# Patient Record
Sex: FEMALE | Race: WHITE | Employment: FULL TIME | URBAN - METROPOLITAN AREA
[De-identification: names, ages, dates, MRNs, and addresses within clinical notes are randomized per-mention and may not be internally consistent; named-entity substitution may affect disease eponyms.]

---

## 2017-05-18 ENCOUNTER — TRANSFERRED RECORDS (OUTPATIENT)
Dept: HEALTH INFORMATION MANAGEMENT | Facility: CLINIC | Age: 27
End: 2017-05-18

## 2017-05-18 LAB
HPV ABSTRACT: NORMAL
PAP-ABSTRACT: NORMAL

## 2018-02-24 ENCOUNTER — TELEPHONE (OUTPATIENT)
Dept: OBGYN | Facility: CLINIC | Age: 28
End: 2018-02-24

## 2018-02-24 NOTE — TELEPHONE ENCOUNTER
"Patient sent a web request today:    \"Pap.\"    Patient is requesting Odessa Lance MD at [facility].    Please contact patient.    Thank you.    Central Scheduling  Liset ESQUIVEL"

## 2018-03-15 ENCOUNTER — OFFICE VISIT (OUTPATIENT)
Dept: OBGYN | Facility: CLINIC | Age: 28
End: 2018-03-15
Payer: COMMERCIAL

## 2018-03-15 VITALS
BODY MASS INDEX: 24.99 KG/M2 | HEART RATE: 76 BPM | DIASTOLIC BLOOD PRESSURE: 64 MMHG | HEIGHT: 65 IN | SYSTOLIC BLOOD PRESSURE: 120 MMHG | WEIGHT: 150 LBS

## 2018-03-15 DIAGNOSIS — Z11.59 SCREENING FOR VIRAL AND CHLAMYDIAL DISEASES: ICD-10-CM

## 2018-03-15 DIAGNOSIS — Z78.9 USES BIRTH CONTROL: ICD-10-CM

## 2018-03-15 DIAGNOSIS — Z11.8 SCREENING FOR VIRAL AND CHLAMYDIAL DISEASES: ICD-10-CM

## 2018-03-15 DIAGNOSIS — R87.810 ASCUS WITH POSITIVE HIGH RISK HPV CERVICAL: ICD-10-CM

## 2018-03-15 DIAGNOSIS — Z01.419 ENCOUNTER FOR GYNECOLOGICAL EXAMINATION WITHOUT ABNORMAL FINDING: Primary | ICD-10-CM

## 2018-03-15 DIAGNOSIS — R87.610 ASCUS WITH POSITIVE HIGH RISK HPV CERVICAL: ICD-10-CM

## 2018-03-15 DIAGNOSIS — Z80.41 FAMILY HISTORY OF OVARIAN CANCER: ICD-10-CM

## 2018-03-15 LAB
HBV CORE AB SERPL QL IA: NONREACTIVE
HBV SURFACE AB SERPL IA-ACNC: 305.54 M[IU]/ML
HCV AB SERPL QL IA: NONREACTIVE
HIV 1+2 AB+HIV1 P24 AG SERPL QL IA: NONREACTIVE

## 2018-03-15 PROCEDURE — 86803 HEPATITIS C AB TEST: CPT | Performed by: OBSTETRICS & GYNECOLOGY

## 2018-03-15 PROCEDURE — 87389 HIV-1 AG W/HIV-1&-2 AB AG IA: CPT | Performed by: OBSTETRICS & GYNECOLOGY

## 2018-03-15 PROCEDURE — 88175 CYTOPATH C/V AUTO FLUID REDO: CPT | Performed by: OBSTETRICS & GYNECOLOGY

## 2018-03-15 PROCEDURE — 87591 N.GONORRHOEAE DNA AMP PROB: CPT | Performed by: OBSTETRICS & GYNECOLOGY

## 2018-03-15 PROCEDURE — 86704 HEP B CORE ANTIBODY TOTAL: CPT | Performed by: OBSTETRICS & GYNECOLOGY

## 2018-03-15 PROCEDURE — 36415 COLL VENOUS BLD VENIPUNCTURE: CPT | Performed by: OBSTETRICS & GYNECOLOGY

## 2018-03-15 PROCEDURE — 87624 HPV HI-RISK TYP POOLED RSLT: CPT | Performed by: OBSTETRICS & GYNECOLOGY

## 2018-03-15 PROCEDURE — 86706 HEP B SURFACE ANTIBODY: CPT | Performed by: OBSTETRICS & GYNECOLOGY

## 2018-03-15 PROCEDURE — 87491 CHLMYD TRACH DNA AMP PROBE: CPT | Performed by: OBSTETRICS & GYNECOLOGY

## 2018-03-15 PROCEDURE — 99385 PREV VISIT NEW AGE 18-39: CPT | Performed by: OBSTETRICS & GYNECOLOGY

## 2018-03-15 PROCEDURE — 86780 TREPONEMA PALLIDUM: CPT | Performed by: OBSTETRICS & GYNECOLOGY

## 2018-03-15 RX ORDER — DROSPIRENONE AND ETHINYL ESTRADIOL 0.02-3(28)
1 KIT ORAL DAILY
Qty: 84 TABLET | Refills: 4 | Status: SHIPPED | OUTPATIENT
Start: 2018-03-15 | End: 2019-08-30

## 2018-03-15 RX ORDER — DROSPIRENONE AND ETHINYL ESTRADIOL 0.02-3(28)
KIT ORAL
COMMUNITY
Start: 2017-05-18 | End: 2018-03-15

## 2018-03-15 ASSESSMENT — ANXIETY QUESTIONNAIRES
7. FEELING AFRAID AS IF SOMETHING AWFUL MIGHT HAPPEN: NOT AT ALL
5. BEING SO RESTLESS THAT IT IS HARD TO SIT STILL: NOT AT ALL
6. BECOMING EASILY ANNOYED OR IRRITABLE: SEVERAL DAYS
GAD7 TOTAL SCORE: 1
IF YOU CHECKED OFF ANY PROBLEMS ON THIS QUESTIONNAIRE, HOW DIFFICULT HAVE THESE PROBLEMS MADE IT FOR YOU TO DO YOUR WORK, TAKE CARE OF THINGS AT HOME, OR GET ALONG WITH OTHER PEOPLE: NOT DIFFICULT AT ALL
3. WORRYING TOO MUCH ABOUT DIFFERENT THINGS: NOT AT ALL
1. FEELING NERVOUS, ANXIOUS, OR ON EDGE: NOT AT ALL
2. NOT BEING ABLE TO STOP OR CONTROL WORRYING: NOT AT ALL

## 2018-03-15 ASSESSMENT — PATIENT HEALTH QUESTIONNAIRE - PHQ9: 5. POOR APPETITE OR OVEREATING: NOT AT ALL

## 2018-03-15 NOTE — Clinical Note
Please abstract the following data from this visit with this patient into the appropriate field in Epic:  Pap smear done on this date: 5/18/2017 per Care Everywhere, by this group: [facility], results were ASCUS HPV-.

## 2018-03-15 NOTE — PROGRESS NOTES
Lilia is a 28 year old  female who presents for annual exam.     Besides routine health maintenance, needs refill on OCP.    HPI:  The patient does not have PCP       NP    Hx genital outbreaks HSV1 last 1yr ago. Tried suppressive valtrex in past, increased her outbreaks.     Mom with ovarian cancer at age 40    On Bonnie, happy with this. Has been on for 7-8 years. Takes continuously. Started when she was younger for acne. Mostly good about taking regularly.     Exercise varied. No supplements.      Needle stick at work 6mo ago, pt and her have been tested and neg. Wants repeat STI testing.           GYNECOLOGIC HISTORY:    Patient's last menstrual period was 2018.  Her current contraception method is: oral contraceptives.  She  reports that she has never smoked. She has never used smokeless tobacco.    Patient is sexually active.  STD testing offered?  Accepted  Last PHQ-9 score on record =   PHQ-9 SCORE 3/15/2018   Total Score 0     Last GAD7 score on record =   DARCI-7 SCORE 3/15/2018   Total Score 1     Alcohol Score = 5    HEALTH MAINTENANCE:  Cholesterol: (No results found for: CHOL   Last Mammo: NA, Result: not applicable, Next Mammo: due at 39   Pap: ASCUS HPV- 5/18/17 at [facility]  Per Care Everywhere- Pap history from [place] sent to [facility]  2009 (age 19): ASCUS HPV-  10 (age 20): LSIL  2011: ASCUS HPV-  10/2014: ASCUS HPV-  2015: LSIL  2015: [place], biopsy taken, pathology result not included with records  2017 ([facility]): ASCUS HPV-    Colonoscopy:  NA, Result: not applicable, Next Colonoscopy: due at 49 years.  Dexa:  NA    Health maintenance updated:  yes    HISTORY:  Obstetric History       T0      L0     SAB0   TAB0   Ectopic0   Multiple0   Live Births0           There is no problem list on file for this patient.    Past Surgical History:   Procedure Laterality Date     C BREAST AUGMENTATION - TIER 2  2014      Social History  "  Substance Use Topics     Smoking status: Never Smoker     Smokeless tobacco: Never Used     Alcohol use Yes      Problem (# of Occurrences) Relation (Name,Age of Onset)    Breast Cancer (2) Paternal Grandmother (30), Paternal Aunt (50)    CEREBROVASCULAR DISEASE (1) Maternal Grandmother    HEART DISEASE (1) Father    Hyperlipidemia (1) Father    Hypertension (1) Father    OSTEOPOROSIS (1) Mother    Ovarian Cancer (1) Mother (48)            Current Outpatient Prescriptions   Medication Sig     drospirenone-ethinyl estradiol (JAD) 3-0.02 MG per tablet Take 1 tablet by mouth daily     No current facility-administered medications for this visit.      No Known Allergies    Past medical, surgical, social and family histories were reviewed and updated in EPIC.    ROS:   12 point review of systems negative other than symptoms noted below.  Genitourinary: Spotting    EXAM:  /64  Pulse 76  Ht 5' 4.75\" (1.645 m)  Wt 150 lb (68 kg)  LMP 02/28/2018  Breastfeeding? No  BMI 25.15 kg/m2   BMI: Body mass index is 25.15 kg/(m^2).    PHYSICAL EXAM:  Constitutional:  Appearance: Well nourished, well developed, alert, in no acute distress  Neck:  Lymph Nodes:  No lymphadenopathy present    Thyroid:  Gland size normal, nontender, no nodules or masses present  on palpation  Chest:  Respiratory Effort:  Breathing unlabored  Cardiovascular:    Heart: Auscultation:  Regular rate, normal rhythm, no murmurs present  Breasts: Inspection of Breasts:  No lymphadenopathy present., Palpation of Breasts and Axillae:  No masses present on palpation, no breast tenderness., Axillary Lymph Nodes:  No lymphadenopathy present. and No nodularity, asymmetry or nipple discharge bilaterally.  Gastrointestinal:   Abdominal Examination:  Abdomen nontender to palpation, tone normal without rigidity or guarding, no masses present, umbilicus without lesions   Liver and Spleen:  No hepatomegaly present, liver nontender to palpation    Hernias:  No " hernias present  Lymphatic: Lymph Nodes:  No other lymphadenopathy present  Skin:  General Inspection:  No rashes present, no lesions present, no areas of  discoloration    Genitalia and Groin:  No rashes present, no lesions present, no areas of  discoloration, no masses present  Neurologic/Psychiatric:    Mental Status:  Oriented X3     Pelvic Exam:  External Genitalia:     Normal appearance for age, no discharge present, no tenderness present, no inflammatory lesions present, color normal  Vagina:     Normal vaginal vault without central or paravaginal defects, no discharge present, no inflammatory lesions present, no masses present  Bladder:     Nontender to palpation  Urethra:   Urethral Body:  Urethra palpation normal, urethra structural support normal   Urethral Meatus:  No erythema or lesions present  Cervix:     Appearance healthy, no lesions present, nontender to palpation, no bleeding present  Uterus:     Uterus: firm, normal sized and nontender, midplane in position.   Adnexa:     No adnexal tenderness present, no adnexal masses present  Perineum:     Perineum within normal limits, no evidence of trauma, no rashes or skin lesions present  Anus:     Anus within normal limits, no hemorrhoids present  Inguinal Lymph Nodes:     No lymphadenopathy present  Pubic Hair:     Normal pubic hair distribution for age  Genitalia and Groin:     No rashes present, no lesions present, no areas of discoloration, no masses present      COUNSELING:   Reviewed preventive health counseling, as reflected in patient instructions       Contraception       Folic Acid Counseling    BMI: Body mass index is 25.15 kg/(m^2).  Weight management plan: Discussed healthy diet and exercise guidelines and patient will follow up in 12 months in clinic to re-evaluate.    ASSESSMENT:  28 year old female with satisfactory annual exam.    ICD-10-CM    1. Encounter for gynecological examination without abnormal finding Z01.419    2. Screening for  viral and chlamydial diseases Z11.59 NEISSERIA GONORRHOEA PCR    Z11.8 CHLAMYDIA TRACHOMATIS PCR     HIV Antigen Antibody Combo     Anti Treponema     Hepatitis C antibody     Hepatitis B core antibody     Hepatitis B Surface Antibody     CANCELED: HCL HEPATITIS B SCREENING IN NON-PREGNANT INDIVIDUAL   3. Uses birth control Z30.9 drospirenone-ethinyl estradiol (JAD) 3-0.02 MG per tablet   4. Needle stick injury W27.3XXA HIV Antigen Antibody Combo     Hepatitis C antibody     Hepatitis B core antibody     Hepatitis B Surface Antibody     CANCELED: HCL HEPATITIS B SCREENING IN NON-PREGNANT INDIVIDUAL   5. Family history of ovarian cancer Z80.41 CANCER RISK MGMT/CANCER GENETIC COUNSELING REFERRAL   6. ASCUS with positive high risk HPV cervical R87.610     R87.810        PLAN:  -Pap/hpv obtained for cervical cancer screening. Will need to get records to verify dysplasia hx and surveillance needs moving forward.  -Breast self awareness discussed.   -Colonoscopy age 49  -Osteoporosis prevention discussed.  -STI labs  -Refill OCPs, happy with   -Return one year for next annual exam      Odessa Harris Masters, DO

## 2018-03-15 NOTE — LETTER
March 27, 2018    Lilia Brown  [address] [address]    Dear Lilia,  We are happy to inform you that your PAP smear result from 3/15/18 is normal.  We are now able to do a follow up test on PAP smears. The DNA test is for HPV (Human Papilloma Virus). Cervical cancer is closely linked with certain types of HPV. Your result showed no evidence of high risk HPV.  Therefore we recommend you return in 3 years for your next pap smear and HPV test.  You will still need to return to the clinic every year for an annual exam and other preventive tests.  Please contact the clinic at [phone] with any questions.  Sincerely,    Odessa Harris Masters, DO/rlm

## 2018-03-15 NOTE — MR AVS SNAPSHOT
After Visit Summary   3/15/2018    Lilia Brown    MRN: [id number]           Patient Information     Date Of Birth          1990        Visit Information        Provider Department      3/15/2018 8:30 AM Odessa Lance DO [facility]        Today's Diagnoses     Encounter for gynecological examination without abnormal finding    -  1    Screening for viral and chlamydial diseases        Uses birth control        Needle stick injury        Family history of ovarian cancer           Follow-ups after your visit        Additional Services     CANCER RISK MGMT/CANCER GENETIC COUNSELING REFERRAL       Your provider has referred you to the Cancer Risk Management Program - Cancer Genetic Counseling.    Reason for Referral: Maternal history ovarian cancer age 48. PGM breast cancer age 29, PAunt breast ca age 49    We have a sent a notice to a staff member of the Cancer Risk Management Program to give you a call to assist with scheduling your appointment.  You may also call  [phone] ([phone]) to initiate scheduling.    Please be aware that coverage of these services is subject to the terms and limitations of your health insurance plan.  Call member services at your health plan with any benefit or coverage questions.      Please bring the completed family history sheet to your appointment in addition to any available outside medical records documenting your cancer diagnosis.                  Follow-up notes from your care team     Return in about 1 year (around 3/15/2019).      Who to contact     If you have questions or need follow up information about today's clinic visit or your schedule please contact [facility] directly at [phone].  Normal or non-critical lab and imaging results will be communicated to you by MyChart, letter or phone within 4 business days after the clinic has received the results. If you do not hear from us  "within 7 days, please contact the clinic through [employer] or phone. If you have a critical or abnormal lab result, we will notify you by phone as soon as possible.  Submit refill requests through [employer] or call your pharmacy and they will forward the refill request to us. Please allow 3 business days for your refill to be completed.          Additional Information About Your Visit        [employer] Information     [employer] lets you send messages to your doctor, view your test results, renew your prescriptions, schedule appointments and more. To sign up, go to [email or web address] . Click on \"Log in\" on the left side of the screen, which will take you to the Welcome page. Then click on \"Sign up Now\" on the right side of the page.     You will be asked to enter the access code listed below, as well as some personal information. Please follow the directions to create your username and password.     Your access code is: [email or web address]  Expires: 2018  9:23 AM     Your access code will  in 90 days. If you need help or a new code, please call your [place] clinic or [phone].        Care EveryWhere ID     This is your Care EveryWhere ID. This could be used by other organizations to access your [place] medical records  [id number]        Your Vitals Were     Pulse Height Last Period Breastfeeding? BMI (Body Mass Index)       76 5' 4.75\" (1.645 m) 2018 No 25.15 kg/m2        Blood Pressure from Last 3 Encounters:   03/15/18 120/64    Weight from Last 3 Encounters:   03/15/18 150 lb (68 kg)              We Performed the Following     Anti Treponema     CANCER RISK MGMT/CANCER GENETIC COUNSELING REFERRAL     CHLAMYDIA TRACHOMATIS PCR     Hepatitis B core antibody     Hepatitis B Surface Antibody     Hepatitis C antibody     HIV Antigen Antibody Combo     NEISSERIA GONORRHOEA PCR          Today's Medication Changes          These changes are accurate as of 3/15/18  9:23 AM.  If you have any questions, ask " your nurse or doctor.               These medicines have changed or have updated prescriptions.        Dose/Directions    drospirenone-ethinyl estradiol 3-0.02 MG per tablet   Commonly known as:  JAD   This may have changed:    - how much to take  - when to take this   Used for:  Uses birth control   Changed by:  Odessa Lance,         Dose:  1 tablet   Take 1 tablet by mouth daily   Quantity:  84 tablet   Refills:  4            Where to get your medicines      These medications were sent to [facility] [id number] [place] [facility] [address] [address]  [address] [address]     Phone:  [phone]     drospirenone-ethinyl estradiol 3-0.02 MG per tablet                Primary Care Provider    None Specified       No primary provider on file.        Equal Access to Services     ALICIA ESTEBAN : Rebecca Rayo, waaxda luqadaha, qaybta kaalmada taylor, neftaly jimenez. So [facility] [phone].    ATENCIÓN: Si habla español, tiene a kaplan disposición servicios gratuitos de asistencia lingüística. [facility] [phone].    We comply with applicable federal civil rights laws and [place] laws. We do not discriminate on the basis of race, color, national origin, age, disability, sex, sexual orientation, or gender identity.            Thank you!     Thank you for choosing [facility]  for your care. Our goal is always to provide you with excellent care. Hearing back from our patients is one way we can continue to improve our services. Please take a few minutes to complete the written survey that you may receive in the mail after your visit with us. Thank you!             Your Updated Medication List - Protect others around you: Learn how to safely use, store and throw away your medicines at www.disposemymeds.org.          This list is accurate as of 3/15/18  9:23 AM.  Always use your most recent med list.                   Brand Name  Dispense Instructions for use Diagnosis    drospirenone-ethinyl estradiol 3-0.02 MG per tablet    JAD    84 tablet    Take 1 tablet by mouth daily    Uses birth control

## 2018-03-16 LAB
C TRACH DNA SPEC QL NAA+PROBE: NEGATIVE
N GONORRHOEA DNA SPEC QL NAA+PROBE: NEGATIVE
SPECIMEN SOURCE: NORMAL
SPECIMEN SOURCE: NORMAL
T PALLIDUM IGG+IGM SER QL: NEGATIVE

## 2018-03-16 ASSESSMENT — PATIENT HEALTH QUESTIONNAIRE - PHQ9: SUM OF ALL RESPONSES TO PHQ QUESTIONS 1-9: 0

## 2018-03-16 ASSESSMENT — ANXIETY QUESTIONNAIRES: GAD7 TOTAL SCORE: 1

## 2018-03-22 LAB
COPATH REPORT: NORMAL
PAP: NORMAL

## 2018-03-23 LAB
FINAL DIAGNOSIS: NORMAL
HPV HR 12 DNA CVX QL NAA+PROBE: NEGATIVE
HPV16 DNA SPEC QL NAA+PROBE: NEGATIVE
HPV18 DNA SPEC QL NAA+PROBE: NEGATIVE
SPECIMEN DESCRIPTION: NORMAL
SPECIMEN SOURCE CVX/VAG CYTO: NORMAL

## 2018-05-12 ENCOUNTER — OFFICE VISIT (OUTPATIENT)
Dept: URGENT CARE | Facility: URGENT CARE | Age: 28
End: 2018-05-12
Payer: COMMERCIAL

## 2018-05-12 ENCOUNTER — RADIANT APPOINTMENT (OUTPATIENT)
Dept: GENERAL RADIOLOGY | Facility: CLINIC | Age: 28
End: 2018-05-12
Attending: PHYSICIAN ASSISTANT
Payer: COMMERCIAL

## 2018-05-12 VITALS
SYSTOLIC BLOOD PRESSURE: 133 MMHG | TEMPERATURE: 99 F | HEART RATE: 88 BPM | DIASTOLIC BLOOD PRESSURE: 90 MMHG | OXYGEN SATURATION: 98 % | RESPIRATION RATE: 16 BRPM

## 2018-05-12 DIAGNOSIS — M25.571 PAIN IN JOINT INVOLVING ANKLE AND FOOT, RIGHT: ICD-10-CM

## 2018-05-12 DIAGNOSIS — M25.572 PAIN IN JOINT INVOLVING ANKLE AND FOOT, LEFT: ICD-10-CM

## 2018-05-12 DIAGNOSIS — S82.831A CLOSED FRACTURE OF DISTAL END OF RIGHT FIBULA, UNSPECIFIED FRACTURE MORPHOLOGY, INITIAL ENCOUNTER: Primary | ICD-10-CM

## 2018-05-12 PROCEDURE — 73610 X-RAY EXAM OF ANKLE: CPT | Mod: LT

## 2018-05-12 PROCEDURE — 99203 OFFICE O/P NEW LOW 30 MIN: CPT | Performed by: PHYSICIAN ASSISTANT

## 2018-05-12 NOTE — MR AVS SNAPSHOT
After Visit Summary   5/12/2018    Lilia Brown    MRN: [id number]           Patient Information     Date Of Birth          1990        Visit Information        Provider Department      5/12/2018 11:25 AM Selene Seo PA-C [facility]        Today's Diagnoses     Closed fracture of distal end of right fibula, initial encounter    -  1      Care Instructions      Ankle Fracture, Distal Fibula  You have a fracture, or broken bone, of the end of the fibula bone. The fibula is one of two bones that support the ankle joint.    Home care    You will be given a splint, cast, or special boot to prevent movement at the injury site. Do not put weight on a splint. It will break. Follow your healthcare provider s advice about when to begin bearing weight on a cast or boot.    Keep your leg elevated when sitting or lying down. When sleeping, place a pillow under the injured leg. When sitting, support the injured leg so it is level with your waist. This is very important during the first 48 hours.    Keep the cast or splint completely dry at all times. When bathing, protect the cast or splint with 2 large plastic bags. Place 1 bag outside of the other. Tape each bag with duct tape at the top end. Water can still leak in even when the foot is covered. So it's best to keep the cast, splint, or boot away from water. If a fiberglass cast or splint gets wet, dry it with a hair dryer on a cool setting.    Place an ice pack over the injured area for no more than 15 to 20 minutes. Do this every 3 to 6 hours for the first 24 to 48 hours. Continue this 3 to 4 times a day as needed. To make an ice pack, put ice cubes in a plastic bag that seals at the top. Wrap the bag in a clean, thin towel or cloth. Never put ice or an ice pack directly on the skin. The ice pack can be put right on the cast or splint. As the ice melts, be careful that the cast or splint doesn t get wet.    You may  use over-the-counter pain medicine to control pain, unless another pain medicine was prescribed. If you have chronic liver or kidney disease or ever had a stomach ulcer or GI bleeding, talk with your provider before using these medicines.  Follow-up care  Follow up with your healthcare provider in 1 week, or as advised. This is to be sure the bone is healing properly. If you were given a splint, it may be changed to a cast after the swelling goes down.  If X-rays were taken, you will be told of any new findings that may affect your care.  When to seek medical advice  Call your healthcare provider right away if any of these occur:    The plaster cast or splint becomes wet or soft    The fiberglass cast or splint stays wet for more than 24 hours    There is increased tightness or pain under the cast or splint    Your toes become swollen, cold, blue, numb, or tingly    The cast becomes loose    The cast has a bad smell    Sore areas develop under the cast    The cast develops cracks or breaks   Date Last Reviewed: 11/23/2015 2000-2017 [employer]. [address] [place] [address]. All rights reserved. This information is not intended as a substitute for professional medical care. Always follow your healthcare professional's instructions.                Follow-ups after your visit        Additional Services     ORTHO  REFERRAL       [facility] is referring you to the Orthopedic  Services at [facility].       The  Representative will assist you in the coordination of your Orthopedic and Musculoskeletal Care as prescribed by your physician.    The  Representative will call you within 1 business day to help schedule your appointment, or you may contact the  Representative at:    All areas ~ [phone]     Type of Referral : Non Surgical       Timeframe requested: 1 - 2 days    Coverage of these services is  subject to the terms and limitations of your health insurance plan.  Please call member services at your health plan with any benefit or coverage questions.      If X-rays, CT or MRI's have been performed, please contact the facility where they were done to arrange for , prior to your scheduled appointment.  Please bring this referral request to your appointment and present it to your specialist.                  Your next 10 appointments already scheduled     May 15, 2018 12:00 PM CDT   (Arrive by 11:45 AM)   NEW WITH ROOM with Vaishnavi Berg GC,  2 114 CONSULT RM   [facility] ([facility])    [address] [place] [address]  [address]  [facility] [id number] [phone]            May 15, 2018  1:15 PM CDT   [facility] Lab Draw with [facility] LAB DRAW   [facility] ([facility])    [address] [place] [address]  [address]  [facility] [id number] [phone]              Who to contact     If you have questions or need follow up information about today's clinic visit or your schedule please contact [facility] directly at [phone].  Normal or non-critical lab and imaging results will be communicated to you by MyChart, letter or phone within 4 business days after the clinic has received the results. If you do not hear from us within 7 days, please contact the clinic through [email or web address] or phone. If you have a critical or abnormal lab result, we will notify you by phone as soon as possible.  Submit refill requests through [employer] or call your pharmacy and they will forward the refill request to us. Please allow 3 business days for your refill to be completed.          Additional Information About Your Visit        [email or web address] Information     [employer] gives you secure access to your electronic health record. If you see a primary care provider, you can also send messages to your care team and make appointments.  If you have questions, please call your primary care clinic.  If you do not have a primary care provider, please call [phone] and they will assist you.        Care EveryWhere ID     This is your Care EveryWhere ID. This could be used by other organizations to access your [place] medical records  [id number]        Your Vitals Were     Pulse Temperature Respirations Pulse Oximetry          88 99  F (37.2  C) (Oral) 16 98%         Blood Pressure from Last 3 Encounters:   05/12/18 133/90   03/15/18 120/64    Weight from Last 3 Encounters:   03/15/18 150 lb (68 kg)              We Performed the Following     ORTHO  REFERRAL          Today's Medication Changes          These changes are accurate as of 5/12/18 12:12 PM.  If you have any questions, ask your nurse or doctor.               Start taking these medicines.        Dose/Directions    order for DME   Used for:  Closed fracture of distal end of right fibula, unspecified fracture morphology, initial encounter   Started by:  Selene Seo PA-C        Equipment being ordered: tall walking boot   Quantity:  1 Device   Refills:  0       order for DME   Used for:  Closed fracture of distal end of right fibula, unspecified fracture morphology, initial encounter   Started by:  Selene Seo PA-C        Equipment being ordered: crutches   Quantity:  1 Device   Refills:  0            Where to get your medicines      Some of these will need a paper prescription and others can be bought over the counter.  Ask your nurse if you have questions.     Bring a paper prescription for each of these medications     order for DME    order for DME                Primary Care Provider Fax #    Provider Not In System [phone]                Equal Access to Services     [facility] AH: Rebecca mikeo Soángela, waaxda luqadaha, qaybta kaalmada taylor, neftaly jimenez. So [facility] [phone].    ATENCIÓN: Igor suarez,  tiene a kaplan disposición servicios gratuitos de asistencia lingüística. Poornima anderson [phone].    We comply with applicable federal civil rights laws and [place] laws. We do not discriminate on the basis of race, color, national origin, age, disability, sex, sexual orientation, or gender identity.            Thank you!     Thank you for choosing [facility]  for your care. Our goal is always to provide you with excellent care. Hearing back from our patients is one way we can continue to improve our services. Please take a few minutes to complete the written survey that you may receive in the mail after your visit with us. Thank you!             Your Updated Medication List - Protect others around you: Learn how to safely use, store and throw away your medicines at www.disposemymeds.org.          This list is accurate as of 5/12/18 12:12 PM.  Always use your most recent med list.                   Brand Name Dispense Instructions for use Diagnosis    drospirenone-ethinyl estradiol 3-0.02 MG per tablet    JAD    84 tablet    Take 1 tablet by mouth daily    Uses birth control       order for DME     1 Device    Equipment being ordered: tall walking boot    Closed fracture of distal end of right fibula, unspecified fracture morphology, initial encounter       order for DME     1 Device    Equipment being ordered: crutches    Closed fracture of distal end of right fibula, unspecified fracture morphology, initial encounter

## 2018-05-12 NOTE — PROGRESS NOTES
"SUBJECTIVE:  Chief Complaint   Patient presents with     Urgent Care     Musculoskeletal Problem     Patient states she fell of her porch this morning heard a \"popping sound\" painful took 400MG ibuprofen at 10:10 AM but not helping with the pain      Lilia Brown is a 28 year old female presents with a chief complaint of right ankle pain.  The injury occurred 2 hour(s) ago. She stepped off a step, fell and heard a \"popping sound\"   The injury happened while at home. How: as aboveimmediate pain, immediate swelling, inability to bear weight directly after injury.  The patient complained of moderate pain  and has had decreased ROM.  Pain exacerbated by walking and weight-bearing.  Relieved by ice.  She treated it initially with Ibuprofen. This is the first time this type of injury has occurred to this patient. NO numbness or tingling into her toes.     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    EXAM:   /90  Pulse 88  Temp 99  F (37.2  C) (Oral)  Resp 16  SpO2 98%  Gen: healthy,alert,no distress  Extremity: ankle has erythema, swelling and tenderness or lateral mallelous  There is not compromise to the distal circulation.  Pulses are +2 and CRT is brisk  GENERAL APPEARANCE: healthy, alert and no distress  EXTREMITIES: peripheral pulses normal  SKIN: no suspicious lesions or " jacobo  NEURO: Normal strength and tone, sensory exam grossly normal, mentation intact and speech normal    X-RAY was done -- fracture of distal fibula    ASSESSMENT  1. Closed fracture of distal end of right fibula, initial encounter  Rest, Ice, Compress, Elevate, no use for 3-4 wks and Ortho referral  - ORTHO  REFERRAL  - order for DME; Equipment being ordered: tall walking boot  Dispense: 1 Device; Refill: 0      Selene Seo PA-C

## 2018-05-12 NOTE — PATIENT INSTRUCTIONS
Ankle Fracture, Distal Fibula  You have a fracture, or broken bone, of the end of the fibula bone. The fibula is one of two bones that support the ankle joint.    Home care    You will be given a splint, cast, or special boot to prevent movement at the injury site. Do not put weight on a splint. It will break. Follow your healthcare provider s advice about when to begin bearing weight on a cast or boot.    Keep your leg elevated when sitting or lying down. When sleeping, place a pillow under the injured leg. When sitting, support the injured leg so it is level with your waist. This is very important during the first 48 hours.    Keep the cast or splint completely dry at all times. When bathing, protect the cast or splint with 2 large plastic bags. Place 1 bag outside of the other. Tape each bag with duct tape at the top end. Water can still leak in even when the foot is covered. So it's best to keep the cast, splint, or boot away from water. If a fiberglass cast or splint gets wet, dry it with a hair dryer on a cool setting.    Place an ice pack over the injured area for no more than 15 to 20 minutes. Do this every 3 to 6 hours for the first 24 to 48 hours. Continue this 3 to 4 times a day as needed. To make an ice pack, put ice cubes in a plastic bag that seals at the top. Wrap the bag in a clean, thin towel or cloth. Never put ice or an ice pack directly on the skin. The ice pack can be put right on the cast or splint. As the ice melts, be careful that the cast or splint doesn t get wet.    You may use over-the-counter pain medicine to control pain, unless another pain medicine was prescribed. If you have chronic liver or kidney disease or ever had a stomach ulcer or GI bleeding, talk with your provider before using these medicines.  Follow-up care  Follow up with your healthcare provider in 1 week, or as advised. This is to be sure the bone is healing properly. If you were given a splint, it may be changed to a  cast after the swelling goes down.  If X-rays were taken, you will be told of any new findings that may affect your care.  When to seek medical advice  Call your healthcare provider right away if any of these occur:    The plaster cast or splint becomes wet or soft    The fiberglass cast or splint stays wet for more than 24 hours    There is increased tightness or pain under the cast or splint    Your toes become swollen, cold, blue, numb, or tingly    The cast becomes loose    The cast has a bad smell    Sore areas develop under the cast    The cast develops cracks or breaks   Date Last Reviewed: 11/23/2015 2000-2017 [employer]. [address] [place] [address]. All rights reserved. This information is not intended as a substitute for professional medical care. Always follow your healthcare professional's instructions.

## 2018-05-14 ENCOUNTER — OFFICE VISIT (OUTPATIENT)
Dept: ORTHOPEDICS | Facility: CLINIC | Age: 28
End: 2018-05-14
Payer: COMMERCIAL

## 2018-05-14 VITALS
HEIGHT: 65 IN | DIASTOLIC BLOOD PRESSURE: 80 MMHG | SYSTOLIC BLOOD PRESSURE: 124 MMHG | BODY MASS INDEX: 24.99 KG/M2 | WEIGHT: 150 LBS

## 2018-05-14 DIAGNOSIS — S82.832A OTHER CLOSED FRACTURE OF DISTAL END OF LEFT FIBULA, INITIAL ENCOUNTER: Primary | ICD-10-CM

## 2018-05-14 PROCEDURE — 27786 TREATMENT OF ANKLE FRACTURE: CPT | Performed by: FAMILY MEDICINE

## 2018-05-14 PROCEDURE — 99204 OFFICE O/P NEW MOD 45 MIN: CPT | Mod: 57 | Performed by: FAMILY MEDICINE

## 2018-05-14 NOTE — MR AVS SNAPSHOT
After Visit Summary   5/14/2018    Lilia Brown    MRN: [id number]           Patient Information     Date Of Birth          1990        Visit Information        Provider Department      5/14/2018 12:00 PM Ag Barrera DO [facility]        Today's Diagnoses     Other closed fracture of distal end of left fibula, initial encounter    -  1      Care Instructions    1. Other closed fracture of distal end of left fibula, initial encounter      Location of pain / swelling is appropriate for the injury  Reviewed xray - fibular fracture, intact mortise  Order for knee scooter  Letter for work: no more than 2 consecutive hours walking/on your feet and no more than 4 hours total during your shift. These recommendation will be in effect x 4 weeks.  Letter for travel - do not recommend any extended walking therefore provide transportation as needed for safe transfers / walking.    Follow up in 3 weeks.          Follow-ups after your visit        Your next 10 appointments already scheduled     May 15, 2018 12:00 PM CDT   (Arrive by 11:45 AM)   NEW WITH ROOM with Vaishnavi Berg GC,  2 114 CONSULT [facility] ([facility])    [address] [place] [address]  [address] [place] [id number] [phone]            May 15, 2018  1:15 PM CDT   [facility] LAB DRAW   [facility] Lab Draw ([facility])    [address] [place] [address]  [address] [place] [id number] [phone]              Who to contact     If you have questions or need follow up information about today's clinic visit or your schedule please contact [facility] directly at [phone].  Normal or non-critical lab and imaging results will be communicated to you by MyChart, letter or phone within 4 business days after the clinic has received the results. If you do not hear from us within 7 days,  "please contact the clinic through [employer] or phone. If you have a critical or abnormal lab result, we will notify you by phone as soon as possible.  Submit refill requests through [employer] or call your pharmacy and they will forward the refill request to us. Please allow 3 business days for your refill to be completed.          Additional Information About Your Visit        Pikimalhart Information     [employer] gives you secure access to your electronic health record. If you see a primary care provider, you can also send messages to your care team and make appointments. If you have questions, please call your primary care clinic.  If you do not have a primary care provider, please call [phone] and they will assist you.        Care EveryWhere ID     This is your Care EveryWhere ID. This could be used by other organizations to access your [facility] medical records  [id number]        Your Vitals Were     Height BMI (Body Mass Index)                5' 4.75\" (1.645 m) 25.15 kg/m2           Blood Pressure from Last 3 Encounters:   05/14/18 124/80   05/12/18 133/90   03/15/18 120/64    Weight from Last 3 Encounters:   05/14/18 150 lb (68 kg)   03/15/18 150 lb (68 kg)              Today, you had the following     No orders found for display       Primary Care Provider Fax #    Provider Not In System [phone]                Equal Access to Services     [facility] : Hadii marielos ku hadasho Soomaali, waaxda luqadaha, qaybta kaalmada adeegyada, neftaly buenrostro hayclarissa rivera . So [facility] [phone].    ATENCIÓN: Si habla español, tiene a kaplan disposición servicios gratuitos de asistencia lingüística. Llame al [phone].    We comply with applicable federal civil rights laws and [place] laws. We do not discriminate on the basis of race, color, national origin, age, disability, sex, sexual orientation, or gender identity.            Thank you!     Thank you for choosing [facility]  for your " care. Our goal is always to provide you with excellent care. Hearing back from our patients is one way we can continue to improve our services. Please take a few minutes to complete the written survey that you may receive in the mail after your visit with us. Thank you!             Your Updated Medication List - Protect others around you: Learn how to safely use, store and throw away your medicines at www.disposemymeds.org.          This list is accurate as of 5/14/18 12:39 PM.  Always use your most recent med list.                   Brand Name Dispense Instructions for use Diagnosis    drospirenone-ethinyl estradiol 3-0.02 MG per tablet    JAD    84 tablet    Take 1 tablet by mouth daily    Uses birth control       order for DME     1 Device    Equipment being ordered: tall walking boot    Closed fracture of distal end of right fibula, unspecified fracture morphology, initial encounter

## 2018-05-14 NOTE — PROGRESS NOTES
ASSESSMENT & PLAN    1. Other closed fracture of distal end of left fibula, initial encounter      Location of pain / swelling is appropriate for the injury  Reviewed xray - fibular fracture, intact mortise  Order for knee scooter  Continue with CAM boot.  Out for range of motion icing  Letter for work: no more than 2 consecutive hours walking/on your feet and no more than 4 hours total during your shift. These recommendation will be in effect x 4 weeks.  Letter for travel - do not recommend any extended walking therefore provide transportation as needed for safe transfers / walking.    Follow up in 3 weeks.    -----    SUBJECTIVE  Lilia Brown is a/an 28 year old female who is seen in consultation at the request of  Selene Seo PA-C for evaluation of left ankle pain. The patient is seen by themselves.    Onset: 2 day(s) ago. Patient describes injury as she stepped in a pothole in her yard and twisted her ankle and fell to the ground  Location of Pain: left lateral ankle, lateral foot  Rating of Pain at worst: 6/10  Rating of Pain Currently: 2/10  Worsened by: at night, bumping the ankle/boot  Better with: Aleve  Treatments tried: rest/activity avoidance, elevation, ice, Tylenol, ibuprofen and CAM boot  Associated symptoms: tingling in 1st-3rd toes at night, swelling, stiffness/tightness, tenderness over the lateral foot and lateral ankle  Orthopedic history: NO  Relevant surgical history: NO  Patient Social History: works as a Nurse at [employer]    Patient's past medical, surgical, social, and family histories were reviewed today and no changes are noted.    REVIEW OF SYSTEMS:  10 point ROS is negative other than symptoms noted above in HPI, Past Medical History or as stated below  Constitutional: NEGATIVE for fever, chills, change in weight  Skin: NEGATIVE for worrisome rashes, moles or lesions  GI/: NEGATIVE for bowel or bladder changes  Neuro: NEGATIVE for weakness, dizziness or  "paresthesias    OBJECTIVE:  /80  Ht 5' 4.75\" (1.645 m)  Wt 150 lb (68 kg)  BMI 25.15 kg/m2   General: healthy, alert and in no distress  HEENT: no scleral icterus or conjunctival erythema  Skin: no suspicious lesions or rash. No jaundice.  CV:  no pedal edema  Resp: normal respiratory effort without conversational dyspnea   Psych: normal mood and affect  Gait: normal steady gait with appropriate coordination and balance  Neuro: Normal light sensory exam of lower extremity  MSK:  LEFT ANKLE  Inspection:  Significant soft tissue swelling and ecchymosis over the lateral ankle and dorsal lateral foot.   Palpation:    Tender about the ATFL, CFL, distal 3rd fibula shaft and minimally over the forefoot.  Nontender over the medial ankle and syndesmosis.  Remainder of bony and ligamentous landmarks are nontender.  Range of Motion:   Limited in all planes  Strength:  Not assessed secondary to fracture  Special Tests:    negative anterior drawer, positive talar tilt, negative forced external rotation/eversion, negative Martel sign    LEFT FOOT  Inspection:    no swelling or ecchymosis  Palpation:    Tender about the peroneal tendon at lateral malleolus. Otherwise remainder of bony/ligamentous landmarks are non-tender.    Independent visualization of the below image:    Recent Results (from the past 744 hour(s))   XR Ankle Left G/E 3 Views    Narrative    ANKLE THREE VIEWS LEFT 5/12/2018 12:04 PM     HISTORY: fell off porch; Pain in joint involving ankle and foot, left    COMPARISON: None.    FINDINGS: There is a fracture through the distal fibula. The fracture  line is located slightly distal to the ankle mortise. There is  associated soft tissue swelling. No displacement.      Impression    IMPRESSION: Fracture distal fibula.    AVELINA COURTNEY MD     Patient's conditions were thoroughly discussed during today's visit with greater than 50% of the visit spent counseling the patient with total time spent face-to-face with " the patient being 20 minutes.    Ag Barrera, DO CAM  [facility]

## 2018-05-14 NOTE — LETTER
May 14, 2018      Lilia Brown  [address] [address]        To Whom It May Concern:    Lilia Brown was seen in our clinic. She may return to work with the following: limited to light duty -recommend no more than 2 consecutive hours of standing/walking and no more than 4 hours total time standing or walking during a shift.  These restrictions are in place for the next 4 weeks.  Patient will schedule follow-up visit prior to the expiration of this letter.      Sincerely,        Ag Barrera DO

## 2018-05-14 NOTE — LETTER
May 14, 2018      Lilia Brown  [address] [address]        To Whom It May Concern:    Lilia Brown was seen in our clinic on 5/14/2018 for a left ankle injury.  Do not recommend any extended walking therefore please provide special transportation for safe transfers/walking while traveling.      Sincerely,        Ag Barrera, DO

## 2018-05-14 NOTE — PATIENT INSTRUCTIONS
1. Other closed fracture of distal end of left fibula, initial encounter      Location of pain / swelling is appropriate for the injury  Reviewed xray - fibular fracture, intact mortise  Order for knee scooter  Letter for work: no more than 2 consecutive hours walking/on your feet and no more than 4 hours total during your shift. These recommendation will be in effect x 4 weeks.  Letter for travel - do not recommend any extended walking therefore provide transportation as needed for safe transfers / walking.    Follow up in 3 weeks.

## 2018-05-14 NOTE — LETTER
5/14/2018         RE: Lilia Brown  [address] [place] [id number]        Dear Colleague,    Thank you for referring your patient, Lilia Brown, to the [facility]. Please see a copy of my visit note below.    ASSESSMENT & PLAN    1. Other closed fracture of distal end of left fibula, initial encounter      Location of pain / swelling is appropriate for the injury  Reviewed xray - fibular fracture, intact mortise  Order for knee scooter  Continue with CAM boot.  Out for range of motion icing  Letter for work: no more than 2 consecutive hours walking/on your feet and no more than 4 hours total during your shift. These recommendation will be in effect x 4 weeks.  Letter for travel - do not recommend any extended walking therefore provide transportation as needed for safe transfers / walking.    Follow up in 3 weeks.    -----    SUBJECTIVE  Lilia Brown is a/an 28 year old female who is seen in consultation at the request of  Selene Seo PA-C for evaluation of left ankle pain. The patient is seen by themselves.    Onset: 2 day(s) ago. Patient describes injury as she stepped in a pothole in her yard and twisted her ankle and fell to the ground  Location of Pain: left lateral ankle, lateral foot  Rating of Pain at worst: 6/10  Rating of Pain Currently: 2/10  Worsened by: at night, bumping the ankle/boot  Better with: Aleve  Treatments tried: rest/activity avoidance, elevation, ice, Tylenol, ibuprofen and CAM boot  Associated symptoms: tingling in 1st-3rd toes at night, swelling, stiffness/tightness, tenderness over the lateral foot and lateral ankle  Orthopedic history: NO  Relevant surgical history: NO  Patient Social History: works as a Nurse at [employer]    Patient's past medical, surgical, social, and family histories were reviewed today and no changes are noted.    REVIEW OF SYSTEMS:  10 point ROS is negative other than symptoms noted above in HPI, Past Medical  "History or as stated below  Constitutional: NEGATIVE for fever, chills, change in weight  Skin: NEGATIVE for worrisome rashes, moles or lesions  GI/: NEGATIVE for bowel or bladder changes  Neuro: NEGATIVE for weakness, dizziness or paresthesias    OBJECTIVE:  /80  Ht 5' 4.75\" (1.645 m)  Wt 150 lb (68 kg)  BMI 25.15 kg/m2   General: healthy, alert and in no distress  HEENT: no scleral icterus or conjunctival erythema  Skin: no suspicious lesions or rash. No jaundice.  CV:  no pedal edema  Resp: normal respiratory effort without conversational dyspnea   Psych: normal mood and affect  Gait: normal steady gait with appropriate coordination and balance  Neuro: Normal light sensory exam of lower extremity  MSK:  LEFT ANKLE  Inspection:  Significant soft tissue swelling and ecchymosis over the lateral ankle and dorsal lateral foot.   Palpation:    Tender about the ATFL, CFL, distal 3rd fibula shaft and minimally over the forefoot.  Nontender over the medial ankle and syndesmosis.  Remainder of bony and ligamentous landmarks are nontender.  Range of Motion:   Limited in all planes  Strength:  Not assessed secondary to fracture  Special Tests:    negative anterior drawer, positive talar tilt, negative forced external rotation/eversion, negative Martel sign    LEFT FOOT  Inspection:    no swelling or ecchymosis  Palpation:    Tender about the peroneal tendon at lateral malleolus. Otherwise remainder of bony/ligamentous landmarks are non-tender.    Independent visualization of the below image:    Recent Results (from the past 744 hour(s))   XR Ankle Left G/E 3 Views    Narrative    ANKLE THREE VIEWS LEFT 5/12/2018 12:04 PM     HISTORY: fell off porch; Pain in joint involving ankle and foot, left    COMPARISON: None.    FINDINGS: There is a fracture through the distal fibula. The fracture  line is located slightly distal to the ankle mortise. There is  associated soft tissue swelling. No displacement.      " Impression    IMPRESSION: Fracture distal fibula.    AVELINA COURTNEY MD     Patient's conditions were thoroughly discussed during today's visit with greater than 50% of the visit spent counseling the patient with total time spent face-to-face with the patient being 20 minutes.    Ag Barrera DO [facility]      Again, thank you for allowing me to participate in the care of your patient.        Sincerely,        Ag Barrera, DO

## 2018-06-06 ENCOUNTER — RADIANT APPOINTMENT (OUTPATIENT)
Dept: GENERAL RADIOLOGY | Facility: CLINIC | Age: 28
End: 2018-06-06
Attending: FAMILY MEDICINE
Payer: COMMERCIAL

## 2018-06-06 ENCOUNTER — OFFICE VISIT (OUTPATIENT)
Dept: ORTHOPEDICS | Facility: CLINIC | Age: 28
End: 2018-06-06
Payer: COMMERCIAL

## 2018-06-06 VITALS
HEIGHT: 65 IN | WEIGHT: 150 LBS | DIASTOLIC BLOOD PRESSURE: 78 MMHG | SYSTOLIC BLOOD PRESSURE: 122 MMHG | BODY MASS INDEX: 24.99 KG/M2

## 2018-06-06 DIAGNOSIS — S82.832D OTHER CLOSED FRACTURE OF DISTAL END OF LEFT FIBULA WITH ROUTINE HEALING, SUBSEQUENT ENCOUNTER: Primary | ICD-10-CM

## 2018-06-06 DIAGNOSIS — S82.832D OTHER CLOSED FRACTURE OF DISTAL END OF LEFT FIBULA WITH ROUTINE HEALING, SUBSEQUENT ENCOUNTER: ICD-10-CM

## 2018-06-06 PROCEDURE — 73610 X-RAY EXAM OF ANKLE: CPT | Mod: LT

## 2018-06-06 PROCEDURE — 99207 ZZC FRACTURE CARE IN GLOBAL PERIOD: CPT | Performed by: FAMILY MEDICINE

## 2018-06-06 NOTE — PATIENT INSTRUCTIONS
1. Other closed fracture of distal end of left fibula with routine healing, subsequent encounter      Doing well. Minimal pain over the fracture site.  Reviewed xray - healing fracture  Transition out of boot  Work at [employer] x one week and then transition back to the [employer] the following week  If unable to progress let me know over MyChart    Follow up as needed.

## 2018-06-06 NOTE — LETTER
"    6/6/2018         RE: Lilia Brown  [address] [place] [id number]        Dear Colleague,    Thank you for referring your patient, Lilia Brown, to the [facility]. Please see a copy of my visit note below.    ASSESSMENT & PLAN    ICD-10-CM    1. Other closed fracture of distal end of left fibula with routine healing, subsequent encounter S82.832D XR Ankle Left G/E 3 Views   Doing well. Minimal pain over the fracture site.  Reviewed xray - healing fracture  Transition out of boot  Work at [employer] x one week and then transition back to the [employer] the following week  If unable to progress let me know over MyChart    Follow up as needed.      ---    SUBJECTIVE:  Lilia Brown is a 28 year old female is seen today for follow up of Other closed fracture of distal end of left fibula with routine healing, subsequent encounter.  Injury occurred on May 12, 2018 (3.5 weeks ago).  Last visit was on May 14, 2018.  She has been in a [employer] Cam boot since her last visit. Rates her currently pain at 0/10 and hasn't had to take any pain medication. Notes some feeling of stiffness/tightness in the left lateral ankle. She has been taking the boot off periodically to do ROM with the ankle.     She has been able to return to work with restrictions at [employer] but hasn't been able to return to work at the [employer] due to the business of the Hospital    Patient's past medical, surgical, social, and family histories are reviewed today in the medical record.    OBJECTIVE:  /78  Ht 5' 4.75\" (1.645 m)  Wt 150 lb (68 kg)  BMI 25.15 kg/m2  General: Well-appearing and in no acute distress  Skin: No evidence of skin breakdown, compromise, or ulceration  NVS: Regional pulses normal.  Capillary refill less than 2 seconds.  Normal sensation noted.  No limitations noted on strength testing. Full range of motion of the exposed digits  MSK:  LEFT ANKLE  Inspection:  No soft tissue swelling and " ecchymosis has resolved.   Palpation:     Nontender about the ATFL, CFL. Mildly tender over distal 3rd fibula shaft.  Range of Motion:   Full in  all planes  Strength:   Grossly intact    IMAGING:  XR Left Ankle  Healing fracture at distal fibular  Final radiology read pending    Patient's conditions were thoroughly discussed during today's visit with greater than 50% of the visit spent counseling the patient with total time spent face-to-face with the patient being 20 minutes.    Ag Barrera DO [facility]    Again, thank you for allowing me to participate in the care of your patient.        Sincerely,        Ag Barrera DO

## 2018-06-06 NOTE — MR AVS SNAPSHOT
After Visit Summary   6/6/2018    Lilia Brown    MRN: [id number]           Patient Information     Date Of Birth          1990        Visit Information        Provider Department      6/6/2018 12:00 PM Ag Barrera,  [facility]        Today's Diagnoses     Other closed fracture of distal end of left fibula with routine healing, subsequent encounter    -  1      Care Instructions    1. Other closed fracture of distal end of left fibula with routine healing, subsequent encounter      Doing well. Minimal pain over the fracture site.  Reviewed xray - healing fracture  Transition out of boot  Work at [employer] x one week and then transition back to the [employer] the following week  If unable to progress let me know over MyChart    Follow up as needed.              Follow-ups after your visit        Additional Services     [facility] PT, HAND, AND CHIROPRACTIC REFERRAL       **This order will print in the [facility] Scheduling Office**    Physical Therapy, Hand Therapy and Chiropractic Care are available through:    *[facility]  *[facility]  *[facility]    Call one number to schedule at any of the above locations: [phone].    Your provider has referred you to: Physical Therapy at [facility] or [facility]    Indication/Reason for Referral: Left fibular fracture - work on range of motion, proprioception and stability  Onset of Illness: see chart  Therapy Orders: Evaluate and Treat  Special Programs: None  Special Request: None    Jumana Denney      Additional Comments for the Therapist or Chiropractor: Formal physical therapy - exercises to include ankle range of motion and ankle strengthening, along with balance, proprioception, and neuromuscular control exercises with use of modalities as appropriate with home exercise prescription.      Please be aware that coverage of these services is subject to the terms and limitations of your health  "insurance plan.  Call member services at your health plan with any benefit or coverage questions.      Please bring the following to your appointment:    *Your personal calendar for scheduling future appointments  *Comfortable clothing                  Who to contact     If you have questions or need follow up information about today's clinic visit or your schedule please contact [facility] directly at [phone].  Normal or non-critical lab and imaging results will be communicated to you by Fangtekhart, letter or phone within 4 business days after the clinic has received the results. If you do not hear from us within 7 days, please contact the clinic through Fangtekhart or phone. If you have a critical or abnormal lab result, we will notify you by phone as soon as possible.  Submit refill requests through Lightningcast or call your pharmacy and they will forward the refill request to us. Please allow 3 business days for your refill to be completed.          Additional Information About Your Visit        Fang[employer] Information     Lightningcast gives you secure access to your electronic health record. If you see a primary care provider, you can also send messages to your care team and make appointments. If you have questions, please call your primary care clinic.  If you do not have a primary care provider, please call [phone] and they will assist you.        Care EveryWhere ID     This is your Care EveryWhere ID. This could be used by other organizations to access your [place] medical records  [id number]        Your Vitals Were     Height BMI (Body Mass Index)                5' 4.75\" (1.645 m) 25.15 kg/m2           Blood Pressure from Last 3 Encounters:   06/06/18 122/78   05/14/18 124/80   05/12/18 133/90    Weight from Last 3 Encounters:   06/06/18 150 lb (68 kg)   05/14/18 150 lb (68 kg)   03/15/18 150 lb (68 kg)              We Performed the Following     [facility] PT, HAND, AND CHIROPRACTIC REFERRAL        " Primary Care Provider Fax #    Provider Not In System [phone]                Equal Access to Services     CRYSTALANNA EULALIA : Hadii aad ku hadkrystinaluis Rayo, garfield garcia, farnazjovi morenogoldiejoshua vazquez, neftaly mejiasamshaina jimenez. So [facility] [phone].    ATENCIÓN: Si habla español, tiene a kaplan disposición servicios gratuitos de asistencia lingüística. Llame al [phone].    We comply with applicable federal civil rights laws and [place] laws. We do not discriminate on the basis of race, color, national origin, age, disability, sex, sexual orientation, or gender identity.            Thank you!     Thank you for choosing [facility]IC[facility]  for your care. Our goal is always to provide you with excellent care. Hearing back from our patients is one way we can continue to improve our services. Please take a few minutes to complete the written survey that you may receive in the mail after your visit with us. Thank you!             Your Updated Medication List - Protect others around you: Learn how to safely use, store and throw away your medicines at www.disposemymeds.org.          This list is accurate as of 6/6/18  4:06 PM.  Always use your most recent med list.                   Brand Name Dispense Instructions for use Diagnosis    drospirenone-ethinyl estradiol 3-0.02 MG per tablet    JAD    84 tablet    Take 1 tablet by mouth daily    Uses birth control       order for DME     1 Device    Equipment being ordered: tall walking boot    Closed fracture of distal end of right fibula, unspecified fracture morphology, initial encounter

## 2018-06-06 NOTE — PROGRESS NOTES
"ASSESSMENT & PLAN    ICD-10-CM    1. Other closed fracture of distal end of left fibula with routine healing, subsequent encounter S82.832D XR Ankle Left G/E 3 Views   Doing well. Minimal pain over the fracture site.  Reviewed xray - healing fracture  Transition out of boot  Work at [employer] x one week and then transition back to the [employer] the following week  If unable to progress let me know over MyChart    Follow up as needed.      ---    SUBJECTIVE:  Lilia Brown is a 28 year old female is seen today for follow up of Other closed fracture of distal end of left fibula with routine healing, subsequent encounter.  Injury occurred on May 12, 2018 (3.5 weeks ago).  Last visit was on May 14, 2018.  She has been in a [employer] boot since her last visit. Rates her currently pain at 0/10 and hasn't had to take any pain medication. Notes some feeling of stiffness/tightness in the left lateral ankle. She has been taking the boot off periodically to do ROM with the ankle.     She has been able to return to work with restrictions at [employer] but hasn't been able to return to work at the [employer] due to the business of the Hospital    Patient's past medical, surgical, social, and family histories are reviewed today in the medical record.    OBJECTIVE:  /78  Ht 5' 4.75\" (1.645 m)  Wt 150 lb (68 kg)  BMI 25.15 kg/m2  General: Well-appearing and in no acute distress  Skin: No evidence of skin breakdown, compromise, or ulceration  NVS: Regional pulses normal.  Capillary refill less than 2 seconds.  Normal sensation noted.  No limitations noted on strength testing. Full range of motion of the exposed digits  MSK:  LEFT ANKLE  Inspection:  No soft tissue swelling and ecchymosis has resolved.   Palpation:    Nontender about the ATFL, CFL. Mildly tender over distal 3rd fibula shaft.  Range of Motion:   Full in  all planes  Strength:   Grossly intact    IMAGING:  XR Left Ankle  Healing fracture at distal fibular  Final " radiology read pending    Patient's conditions were thoroughly discussed during today's visit with greater than 50% of the visit spent counseling the patient with total time spent face-to-face with the patient being 20 minutes.    Ag Barrera DO [facility]

## 2018-06-07 ENCOUNTER — THERAPY VISIT (OUTPATIENT)
Dept: PHYSICAL THERAPY | Facility: CLINIC | Age: 28
End: 2018-06-07
Attending: FAMILY MEDICINE
Payer: COMMERCIAL

## 2018-06-07 DIAGNOSIS — M25.572 PAIN IN JOINT INVOLVING ANKLE AND FOOT, LEFT: Primary | ICD-10-CM

## 2018-06-07 PROCEDURE — 97110 THERAPEUTIC EXERCISES: CPT | Mod: GP | Performed by: PHYSICAL THERAPIST

## 2018-06-07 PROCEDURE — 97112 NEUROMUSCULAR REEDUCATION: CPT | Mod: GP | Performed by: PHYSICAL THERAPIST

## 2018-06-07 PROCEDURE — 97161 PT EVAL LOW COMPLEX 20 MIN: CPT | Mod: GP | Performed by: PHYSICAL THERAPIST

## 2018-06-07 NOTE — MR AVS SNAPSHOT
After Visit Summary   6/7/2018    Lilia Brown    MRN: [id number]           Patient Information     Date Of Birth          1990        Visit Information        Provider Department      6/7/2018 3:50 PM Cassandra Skelton PT [facility] Physical Therapy        Today's Diagnoses     Pain in joint involving ankle and foot, left    -  1       Follow-ups after your visit        Your next 10 appointments already scheduled     Jun 27, 2018 12:40 PM CDT   TRAMAINE Extremity with Cassandra Skelton PT   [facility] Physical Therapy ([facility]  )    [address]  [place] [address] [phone]              Who to contact     If you have questions or need follow up information about today's clinic visit or your schedule please contact [facility] PH[facility] THERAPY directly at [phone].  Normal or non-critical lab and imaging results will be communicated to you by JasonDBhart, letter or phone within 4 business days after the clinic has received the results. If you do not hear from us within 7 days, please contact the clinic through Metagenicst or phone. If you have a critical or abnormal lab result, we will notify you by phone as soon as possible.  Submit refill requests through Safety Hound or call your pharmacy and they will forward the refill request to us. Please allow 3 business days for your refill to be completed.          Additional Information About Your Visit        MyChart Information     Safety Hound gives you secure access to your electronic health record. If you see a primary care provider, you can also send messages to your care team and make appointments. If you have questions, please call your primary care clinic.  If you do not have a primary care provider, please call [phone] and they will assist you.        Care EveryWhere ID     This is your Care EveryWhere ID. This could be used by other  organizations to access your [place] medical records  [id number]         Blood Pressure from Last 3 Encounters:   06/06/18 122/78   05/14/18 124/80   05/12/18 133/90    Weight from Last 3 Encounters:   06/06/18 68 kg (150 lb)   05/14/18 68 kg (150 lb)   03/15/18 68 kg (150 lb)              We Performed the Following     HC PT EVAL, LOW COMPLEXITY     TRAMAINE INITIAL EVAL REPORT     NEUROMUSCULAR RE-EDUCATION     THERAPEUTIC EXERCISES        Primary Care Provider Fax #    Provider Not In System [phone]                Equal Access to Services     [facility]: Hadii aad dani hadasho Soángela, waaxda luqadaha, qaybta kaalmada taylor, neftaly rivera . So [facility] [phone].    ATENCIÓN: Si habla español, tiene a kaplan disposición servicios gratuitos de asistencia lingüística. [facility] [phone].    We comply with applicable federal civil rights laws and [place] laws. We do not discriminate on the basis of race, color, national origin, age, disability, sex, sexual orientation, or gender identity.            Thank you!     Thank you for choosing [facility]  for your care. Our goal is always to provide you with excellent care. Hearing back from our patients is one way we can continue to improve our services. Please take a few minutes to complete the written survey that you may receive in the mail after your visit with us. Thank you!             Your Updated Medication List - Protect others around you: Learn how to safely use, store and throw away your medicines at www.disposemymeds.org.          This list is accurate as of 6/7/18  4:33 PM.  Always use your most recent med list.                   Brand Name Dispense Instructions for use Diagnosis    drospirenone-ethinyl estradiol 3-0.02 MG per tablet    JAD    84 tablet    Take 1 tablet by mouth daily    Uses birth control       order for DME     1 Device    Equipment being ordered: tall walking  boot    Closed fracture of distal end of right fibula, unspecified fracture morphology, initial encounter

## 2018-06-07 NOTE — PROGRESS NOTES
[facility] Initial Evaluation  Subjective:  Patient is a 28 year old female presenting with rehab left ankle/foot hpi. The history is provided by the patient. No  was used.   Lilia Brown is a 28 year old female with a left ankle condition.  Condition occurred with:  A wrong landing.    This is a new condition  Pt was seen by MD on 6/6/18. DOI was 5/12/18 when pt stepped into a pothole and fractured her distal fibula. Was in boot until recently. Pt states she has no pain and is feeling good. Xrays from 6/6 said everything was healing well and can wean out of boot. Pt not wearing boot at home. Has continued to work her 10 hour shifts throughout the time.    .    Patient reports pain:  Anterior (stiffness).      Pain Scale: 0/0.                                                              Objective:    Gait:  L calf stiffness with hip hiking to compensate                Ankle/Foot Evaluation  ROM:  AROM is not assessed (d/t fracture site).              EDEMA: Edema ankle: slight edema L side. Slight bruising on medial ankle from boot.           MOBILITY TESTING:       Talocrural Left: hypomobile                                                              General     ROS    Assessment/Plan:    Patient is a 28 year old female with left side ankle complaints.    Patient has the following significant findings with corresponding treatment plan.                Diagnosis 1:  L ankle pain  Decreased ROM/flexibility - manual therapy and therapeutic exercise  Decreased joint mobility - manual therapy and therapeutic exercise  Edema - vasopneumatics  Impaired gait - gait training  Impaired muscle performance - neuro re-education  Decreased function - therapeutic activities    Therapy Evaluation Codes:   1) History comprised of:   Personal factors that impact the plan of care:      None.    Comorbidity factors that impact the plan of care are:      None.     Medications impacting care:  None.  2) Examination of Body Systems comprised of:   Body structures and functions that impact the plan of care:      Ankle.   Activity limitations that impact the plan of care are:      Squatting/kneeling.  3) Clinical presentation characteristics are:   Stable/Uncomplicated.  4) Decision-Making    Low complexity using standardized patient assessment instrument and/or measureable assessment of functional outcome.  Cumulative Therapy Evaluation is: Low complexity.    Previous and current functional limitations:  (See Goal Flow Sheet for this information)    Short term and Long term goals: (See Goal Flow Sheet for this information)     Communication ability:  Patient appears to be able to clearly communicate and understand verbal and written communication and follow directions correctly.  Treatment Explanation - The following has been discussed with the patient:   RX ordered/plan of care  Anticipated outcomes  Possible risks and side effects  This patient would benefit from PT intervention to resume normal activities.   Rehab potential is good.    Frequency:  1 X week, once daily  Duration:  for 6 weeks  Discharge Plan:  Achieve all LTG.  Independent in home treatment program.  Reach maximal therapeutic benefit.    Please refer to the daily flowsheet for treatment today, total treatment time and time spent performing 1:1 timed codes.

## 2018-06-08 NOTE — PROGRESS NOTES
[facility] Initial Evaluation  Subjective:  Patient is a 28 year old female presenting with rehab left ankle/foot hpi.                                      Pertinent medical history includes:  Migraines/headaches and rheumatoid arthritis.  Medical allergies: no.  Other surgeries include:  None reported.  Current medications:  None as reported by patient.  Current occupation is RN  .    Primary job tasks include:  Prolonged standing and other (pushing/pulling).    Barriers include:  None as reported by patient.    Red flags:  None as reported by patient.                        Objective:  System    Physical Exam    General     ROS    Assessment/Plan:

## 2018-07-16 PROBLEM — M25.572 PAIN IN JOINT INVOLVING ANKLE AND FOOT, LEFT: Status: RESOLVED | Noted: 2018-06-07 | Resolved: 2018-07-16

## 2018-07-16 NOTE — PROGRESS NOTES
Subjective:  HPI                    Objective:  System    Physical Exam    General     ROS    Assessment/Plan:    DISCHARGE REPORT    Progress reporting period is from 6/7/2018       SUBJECTIVE  Subjective changes noted by patient:  Unknown.  Patient was only seen for initial visit.     Current pain level is NA Current Pain level: 0/10.     Previous pain level was   Initial Pain level: 0/10.   Changes in function:  Patient call to report she was having not issues in late June.  Adverse reaction to treatment or activity: None    OBJECTIVE  Changes noted in objective findings:  None    ASSESSMENT/PLAN  Updated problem list and treatment plan: Diagnosis 1:  Fibular fracture  Pain -  manual therapy, self management, education and home program  Decreased ROM/flexibility - manual therapy, therapeutic exercise and home program  Decreased strength - therapeutic exercise, therapeutic activities and home program  Decreased function - therapeutic activities and home program  STG/LTGs have been met or progress has been made towards goals:  unknown  Assessment of Progress: The patient's condition is improving.  Self Management Plans:  Patient has been instructed in a home treatment program.    Lilia continues to require the following intervention to meet STG and LTG's:  Patient needs to continue to work on the home exercise program.      Recommendations:  This patient is ready to be discharged from therapy and continue their home treatment program.    Please refer to the daily flowsheet for treatment today, total treatment time and time spent performing 1:1 timed codes.

## 2019-08-30 ENCOUNTER — ANCILLARY PROCEDURE (OUTPATIENT)
Dept: ULTRASOUND IMAGING | Facility: CLINIC | Age: 29
End: 2019-08-30
Payer: COMMERCIAL

## 2019-08-30 ENCOUNTER — TRANSFERRED RECORDS (OUTPATIENT)
Dept: HEALTH INFORMATION MANAGEMENT | Facility: CLINIC | Age: 29
End: 2019-08-30

## 2019-08-30 ENCOUNTER — PRENATAL OFFICE VISIT (OUTPATIENT)
Dept: MIDWIFE SERVICES | Facility: CLINIC | Age: 29
End: 2019-08-30
Payer: COMMERCIAL

## 2019-08-30 VITALS
HEART RATE: 110 BPM | BODY MASS INDEX: 25.66 KG/M2 | HEIGHT: 65 IN | SYSTOLIC BLOOD PRESSURE: 129 MMHG | WEIGHT: 154 LBS | DIASTOLIC BLOOD PRESSURE: 85 MMHG

## 2019-08-30 DIAGNOSIS — Z87.39 HISTORY OF JUVENILE RHEUMATOID ARTHRITIS: ICD-10-CM

## 2019-08-30 DIAGNOSIS — Z13.79 GENETIC SCREENING: ICD-10-CM

## 2019-08-30 DIAGNOSIS — Z34.91 VIABLE PREGNANCY IN FIRST TRIMESTER: ICD-10-CM

## 2019-08-30 DIAGNOSIS — Z98.82 HISTORY OF BREAST AUGMENTATION: ICD-10-CM

## 2019-08-30 DIAGNOSIS — O09.91 HIGH-RISK PREGNANCY IN FIRST TRIMESTER: Primary | ICD-10-CM

## 2019-08-30 DIAGNOSIS — Z11.3 ROUTINE SCREENING FOR STI (SEXUALLY TRANSMITTED INFECTION): ICD-10-CM

## 2019-08-30 DIAGNOSIS — Z86.19 HISTORY OF PCR DNA POSITIVE FOR HSV1: ICD-10-CM

## 2019-08-30 DIAGNOSIS — Z3A.11 11 WEEKS GESTATION OF PREGNANCY: ICD-10-CM

## 2019-08-30 PROBLEM — Z34.00 SUPERVISION OF NORMAL IUP (INTRAUTERINE PREGNANCY) IN PRIMIGRAVIDA: Status: ACTIVE | Noted: 2019-08-30

## 2019-08-30 LAB
ABO + RH BLD: NORMAL
ABO + RH BLD: NORMAL
ALBUMIN UR-MCNC: NEGATIVE MG/DL
APPEARANCE UR: CLEAR
BILIRUB UR QL STRIP: NEGATIVE
BLD GP AB SCN SERPL QL: NORMAL
BLOOD BANK CMNT PATIENT-IMP: NORMAL
COLOR UR AUTO: YELLOW
ERYTHROCYTE [DISTWIDTH] IN BLOOD BY AUTOMATED COUNT: 11.9 % (ref 10–15)
GLUCOSE UR STRIP-MCNC: NEGATIVE MG/DL
HCT VFR BLD AUTO: 35.4 % (ref 35–47)
HGB BLD-MCNC: 12.3 G/DL (ref 11.7–15.7)
HGB UR QL STRIP: NEGATIVE
KETONES UR STRIP-MCNC: NEGATIVE MG/DL
LEUKOCYTE ESTERASE UR QL STRIP: NEGATIVE
MCH RBC QN AUTO: 32.5 PG (ref 26.5–33)
MCHC RBC AUTO-ENTMCNC: 34.7 G/DL (ref 31.5–36.5)
MCV RBC AUTO: 94 FL (ref 78–100)
NITRATE UR QL: NEGATIVE
PH UR STRIP: 6 PH (ref 5–7)
PLATELET # BLD AUTO: 214 10E9/L (ref 150–450)
RBC # BLD AUTO: 3.78 10E12/L (ref 3.8–5.2)
SOURCE: NORMAL
SP GR UR STRIP: 1.02 (ref 1–1.03)
SPECIMEN EXP DATE BLD: NORMAL
TSH SERPL DL<=0.005 MIU/L-ACNC: 1.98 MU/L (ref 0.4–4)
UROBILINOGEN UR STRIP-ACNC: 0.2 EU/DL (ref 0.2–1)
WBC # BLD AUTO: 7.6 10E9/L (ref 4–11)

## 2019-08-30 PROCEDURE — 99000 SPECIMEN HANDLING OFFICE-LAB: CPT | Performed by: NURSE PRACTITIONER

## 2019-08-30 PROCEDURE — 86850 RBC ANTIBODY SCREEN: CPT | Performed by: NURSE PRACTITIONER

## 2019-08-30 PROCEDURE — 82306 VITAMIN D 25 HYDROXY: CPT | Performed by: NURSE PRACTITIONER

## 2019-08-30 PROCEDURE — 40000791 ZZHCL STATISTIC VERIFI PRENATAL TRISOMY 21,18,13: Mod: 90 | Performed by: NURSE PRACTITIONER

## 2019-08-30 PROCEDURE — 84443 ASSAY THYROID STIM HORMONE: CPT | Performed by: NURSE PRACTITIONER

## 2019-08-30 PROCEDURE — 87389 HIV-1 AG W/HIV-1&-2 AB AG IA: CPT | Performed by: NURSE PRACTITIONER

## 2019-08-30 PROCEDURE — 99207 ZZC FIRST OB VISIT: CPT | Performed by: NURSE PRACTITIONER

## 2019-08-30 PROCEDURE — 86900 BLOOD TYPING SEROLOGIC ABO: CPT | Performed by: NURSE PRACTITIONER

## 2019-08-30 PROCEDURE — 86780 TREPONEMA PALLIDUM: CPT | Performed by: NURSE PRACTITIONER

## 2019-08-30 PROCEDURE — 86901 BLOOD TYPING SEROLOGIC RH(D): CPT | Performed by: NURSE PRACTITIONER

## 2019-08-30 PROCEDURE — 36415 COLL VENOUS BLD VENIPUNCTURE: CPT | Performed by: NURSE PRACTITIONER

## 2019-08-30 PROCEDURE — 87340 HEPATITIS B SURFACE AG IA: CPT | Performed by: NURSE PRACTITIONER

## 2019-08-30 PROCEDURE — 76817 TRANSVAGINAL US OBSTETRIC: CPT | Performed by: OBSTETRICS & GYNECOLOGY

## 2019-08-30 PROCEDURE — 87086 URINE CULTURE/COLONY COUNT: CPT | Performed by: NURSE PRACTITIONER

## 2019-08-30 PROCEDURE — 86762 RUBELLA ANTIBODY: CPT | Performed by: NURSE PRACTITIONER

## 2019-08-30 PROCEDURE — 85027 COMPLETE CBC AUTOMATED: CPT | Performed by: NURSE PRACTITIONER

## 2019-08-30 PROCEDURE — 40001065 ZZHCL STATISTIC PREPARENT STANDARD PANEL: Mod: 90 | Performed by: NURSE PRACTITIONER

## 2019-08-30 PROCEDURE — 81003 URINALYSIS AUTO W/O SCOPE: CPT | Performed by: NURSE PRACTITIONER

## 2019-08-30 ASSESSMENT — ANXIETY QUESTIONNAIRES
3. WORRYING TOO MUCH ABOUT DIFFERENT THINGS: NOT AT ALL
6. BECOMING EASILY ANNOYED OR IRRITABLE: NOT AT ALL
5. BEING SO RESTLESS THAT IT IS HARD TO SIT STILL: NOT AT ALL
1. FEELING NERVOUS, ANXIOUS, OR ON EDGE: SEVERAL DAYS
2. NOT BEING ABLE TO STOP OR CONTROL WORRYING: NOT AT ALL
IF YOU CHECKED OFF ANY PROBLEMS ON THIS QUESTIONNAIRE, HOW DIFFICULT HAVE THESE PROBLEMS MADE IT FOR YOU TO DO YOUR WORK, TAKE CARE OF THINGS AT HOME, OR GET ALONG WITH OTHER PEOPLE: NOT DIFFICULT AT ALL
7. FEELING AFRAID AS IF SOMETHING AWFUL MIGHT HAPPEN: SEVERAL DAYS
GAD7 TOTAL SCORE: 2

## 2019-08-30 ASSESSMENT — PATIENT HEALTH QUESTIONNAIRE - PHQ9
SUM OF ALL RESPONSES TO PHQ QUESTIONS 1-9: 0
5. POOR APPETITE OR OVEREATING: NOT AT ALL

## 2019-08-30 ASSESSMENT — MIFFLIN-ST. JEOR: SCORE: 1415.45

## 2019-08-30 NOTE — PROGRESS NOTES
SUBJECTIVE:     HPI:    This is a 29 year old female patient,  who presents for her first obstetrical visit.    MIGUEL: 3/15/2020, by Ultrasound.  She is 11w5d weeks.  Her cycles are irregular.  Her last menstrual period was normal.   Since her LMP, she has experienced  nausea, emesis, headache and reflux, bloating).   She denies abdominal pain, loss of appetite, vaginal discharge, dysuria, pelvic pain, urinary urgency, lightheadedness, urinary frequency, vaginal bleeding, hemorrhoids and constipation.    Additional History: ASCUS neg HPV 2017, NIL , history of HSV 1 genital, history of juvenile rheumatoid arthritis (has not been on any meds since age 20)    Have you travelled during the pregnancy?No  Have your sexual partner(s) travelled during the pregnancy?No      HISTORY:   Planned Pregnancy: Yes  Marital Status: Single  Occupation: RN [employer]  Living in Household: Significant Other    Past History:  Her past medical history   Past Medical History:   Diagnosis Date     HSV-1 infection     genital     Juvenile arthritis (H)     off meds since age 20     Migraines     No visual aura     Pap smear abnormality of cervix    .      She has a history of  first pregnancy    Since her last LMP she denies use of alcohol, tobacco and street drugs.  OB HISTORY  OB History    Para Term  AB Living   1 0 0 0 0 0   SAB TAB Ectopic Multiple Live Births   0 0 0 0 0      # Outcome Date GA Lbr Js/2nd Weight Sex Delivery Anes PTL Lv   1 Current                  PERSONAL HISTORY  Exercise Habits:  walking 4-7 days per week.  Employment: Full time.  Her job involves light activity with moderate potential for toxic exposure.  Works as an RN at [employer]  Travel plans:  are intra [place] air travel.   Diet: eats regular meals and takes daily vitamins  Abuse concerns? No  Hgb A1c screen:  BMI > 30: No, 1st degree family DM: No, History of GDM: No, PCOS: No, High risk ethnicity: No      Past  "medical, surgical, social and family history were reviewed and updated in [facility].      Current Outpatient Medications   Medication     Prenat w/o G-JH-Wprocui-FA-DHA (PNV-DHA PO)     order for DME     No current facility-administered medications for this visit.        ROS:   12 point review of systems negative other than symptoms noted below.  Constitutional: Fatigue  Gastrointestinal: Bloating, Nausea and Vomiting      OBJECTIVE:     EXAM:  /85 (BP Location: Left arm, Patient Position: Sitting, Cuff Size: Adult Regular)   Pulse 110   Ht 1.645 m (5' 4.75\")   Wt 69.9 kg (154 lb)   LMP 06/21/2019   BMI 25.83 kg/m   Body mass index is 25.83 kg/m .    GENERAL: healthy, alert and no distress  EYES: Eyes grossly normal to inspection, PERRL and conjunctivae and sclerae normal  HENT: ear canals and TM's normal, nose and mouth without ulcers or lesions  NECK: no adenopathy, no asymmetry, masses, or scars and thyroid normal to palpation  RESP: lungs clear to auscultation - no rales, rhonchi or wheezes  BREAST: deferred   CV: regular rate and rhythm, normal S1 S2, no S3 or S4, no murmur, click or rub, no peripheral edema and peripheral pulses strong  ABDOMEN: soft, nontender, no hepatosplenomegaly, no masses and bowel sounds normal  MS: no gross musculoskeletal defects noted, no edema  SKIN: no suspicious lesions or rashes  NEURO: Normal strength and tone, mentation intact and speech normal  PSYCH: mentation appears normal, affect normal/bright    ASSESSMENT/PLAN:       ICD-10-CM    1. High-risk pregnancy in first trimester O09.91 ABO/Rh type and screen     Hepatitis B surface antigen     CBC with platelets     HIV Antigen Antibody Combo     Rubella Antibody IgG Quantitative     Treponema Abs w Reflex to RPR and Titer     Urine Culture Aerobic Bacterial     *UA reflex to Microscopic     Non Invasive Prenatal Test Cell Free DNA     TSH with free T4 reflex     Vitamin D Deficiency     Preparent Standard Panel   2. " History of PCR DNA positive for HSV1 Z86.19    3. History of breast augmentation Z98.82    4. Genetic screening Z13.79 Non Invasive Prenatal Test Cell Free DNA     Preparent Standard Panel   5. Routine screening for STI (sexually transmitted infection) Z11.3 Chlamydia trachomatis PCR     Neisseria gonorrhoeae PCR   6. History of juvenile rheumatoid arthritis Z87.39    7. 11 weeks gestation of pregnancy Z3A.11        29 year old , 11w5d weeks of pregnancy with MIGUEL of 3/15/2020, by Ultrasound     consult for US for AMA patients: NA  Genetic Testing reviewed and discussed, patient desires Innatal and standard panel. Handout provided.  Genetic screening consent signed      COUNSELING    Instructed on use of triage nurse line and contacting the on call CNM after hours in an emergency.     Symptoms of N&V and fatigue usually start to resolve around 12-16 weeks     Reviewed CNM philosophy, call schedule for labor and delivery, and FSH for delivery    1st OB handout given outlining appointment spacing and CNM information    Reviewed exercise and nutrition    Recommend to gain 25 pounds with her pregnancy.    Discussed OTC medications. OB med list given    Encouraged patient to take PNV's/DHA    Travel precautions discussed, no air travel after 36 weeks and Zika Virus discussed    Will call patient with lab results when available    Will consider referral to MFM if any flare up of RA    F/U to be addressed next visit:  Need GC/CT (urine order futured)    Will return to the clinic in 4 weeks for her next routine prenatal check.  Will call to be seen sooner if problems arise.    STEFANO Navarrete CNM

## 2019-08-30 NOTE — PATIENT INSTRUCTIONS
Thank you for coming to see the Midwives at the   [facility]!      We will notify you about your labs that were drawn today once we get the results back.  If you have [facility] your lab results will be posted there.      Someone from the clinic will call you personally or send you a Oodle message with your results.      If you need any refills of medications please call your pharmacy and they will contact us.      If you have a medical emergency please call 911.      If you have any concerns about today's visit, wish to schedule another appointment, or have an urgent medical concern please call our office at [phone]. You can also make appointments through Oodle.      After hours you may also call the clinic number above to be connected with [facility] after hours triage nurse.  The nurse can page the midwife on call if needed. There is always a midwife on call 24 hours a day.    Prenatal Care Recommendations:    Before 14 weeks: Dating ultrasound, genetic testing       This ultrasound helps us determine your dates accurately. Innatal (genetic screening test) can be drawn anytime after 10 weeks of gestation.    16 weeks: Optional genetic testing single AFP       This testing helps understand your baby's risk for some genetic abnormalities.    18-22 weeks:  Screening anatomy ultrasound       This testing will look for early growth abnormalities, placenta location, and may tell the baby's gender if you wish to find out.    24-27 weeks: One hour diabetes test (GCT) and complete blood count       This test helps identify diabetes of pregnancy or gestational diabetes.  We also look   at the iron in your blood and how well your blood clots.    28 - 36 weeks: Tetanus shot (Tdap)       This shot helps protect you and your baby from whooping cough.    36 weeks and later: Group B Strep test (GBS)       This test helps predict if you need antibiotics in labor to prevent infection for your baby.    Anytime  September to April:  Flu shot       This shot helps protect you and your family from the flu.  This is especially important during pregnancy.        The typical schedule after your first visit today you can expect:     Visit 2 - 12-16 weeks  Visit 3 - 20 weeks  Visit 4 - 24 weeks  Visit 5 - 28 weeks  Visit 6 - 30 weeks  Visit 7 - 32 weeks  Visit 8 - 34 weeks  Visit 9 - 36 weeks  Weekly after 36 weeks until delivery.        Any time during or after your pregnancy you may experience increased depression and/or mood changes.    We are here to support you. Please contact us if you are:    Feeling anxious    Overwhelmed or sad     Trouble sleeping    Crying uncontrollably    Trouble caring for yourself or baby.    Any thoughts of hurting yourself, your baby, or anyone else    If anything comes up between your visits or you have concerns please don't hesitate to contact us.    Secure access to your medical record:  Use [employer]ious [employer] (secure email communication and access to your chart) to send your primary care provider a message or make an appointment. Ask someone on your Team how to sign up for Serious Business. To log on to Topguest or for more information in Serious Business please visit the website at [email or web address].       Certified Nurse Midwife (CNM) Team    CHERYL Mendez CNM Melissa Kitzman, APRN, CHERYL, [facility]  Thomas Pulido, PÉREZ, APRMAHSA, CNM      Again, thank you for choosing the midwives at [facility].  We are excited to be a part of your pregnancy. Please let us know how we can best partner with you to improve your and your family's health.    Remedies for nausea and vomiting with pregnancy      Eat small frequent meals every 2-3 hours if possible.       Avoid food at extremes of temperature and drinks with carbonation.      Eat foods that appeal to you, avoiding fats and spicy foods.      Avoid liquids with foods.  Drink liquids 30-60 minutes before or after eating and sip  slowly.      Bread, pasta, crackers, potatoes, and rice tend to be tolerated the best.      Don't worry about what you eat in the first 3 months, it is more important that you can eat and keep it down.       Try flat ginger ale or ginger tea      Before rising in the morning, eat a small amount of crackers or dry toast.      Peppermint tea      Ginger is a herbal remedy for nausea and you can use it in any form.  There are ginger tablets you can purchase.  The dose 1000 mg a day in divided doses.       You may also try doxylamine (Unisom) 12.5 mg three times a day which is a sleeping medication along with Vitamin B6 25 mg three times a day.  This combination takes up to a week to work so give it some time.       Benadryl (diphenhydramine) 25-50 mg every 8 hour or Dramamine (dimenhydrinate)  mg by mouth every 4-6 hours. Both of these medication may cause some drowsiness      Other things that may help include an Accupressure band and acupuncture      If these methods fail there are many prescriptions that we can try    If you begin to vomit more than 5 or 6 times a day and feel that you are unable to keep anything down, call the [facility] at [phone]  Genetic Screening in Pregnancy    There are several options you have for genetic screening in your pregnancy.  Everyone has their own personal reasons to screen or not to screen.  We want you to make the best choice for you and your pregnancy.  Below is a list of options, what they screen for and when the screening is done.  Genetic screening is recommended for women who are 35 and older at delivery and for those with a family history of chromosomal abnormalities. However, screening is offered to all women.    Innatal:    This is a screening for the more common chromosome abnormalities, including trisomy 21 (Down's syndrome), trisomy 18, trisomy 13 and sex chromosome abnormalities. This is a prenatal test that can be done as early as 10 weeks  "gestation.       A maternal blood sample is drawn that sequences cell-free DNA in maternal blood stream. This in turn allows for molecular counting of chromosome copy numbers with a >99% detection rate of Down syndrome, a 97% detection rate of Trisomy 18, and a 78% detection rate of Trisomy 13.    This test will give information about the gender of the baby.  You can choose to not have that disclosed.       Results come back within 10-14 business days.     About 5% of samples will have results that are designated as \"indeterminate\" or \"uninterruptable.\" With this type of result, genetic counseling and diagnostic testing are recommended. Remember this is a screening test and does not definitively diagnose or exclude the presence of these chromosome conditions.     This screening may or may not be covered by insurance.  We recommend you consult your insurance company to discuss.  Financial assistance is available at a low cost if not covered by your insurance.       Standard Screen:  This test screens for 23 disorders that cause serious health effects in infancy or childhood.  Most hereditary genetic disorders are autosomal recessive, meaning both parents must be carriers for the child to be at risk.  There are some X-linked disorders where only the mother needs to be a carrier for the child to be at risk.   Below are some of the more common of the 23 disorders screened for:     1.  Cystic Fibrosis (CF) is the most common life-shortening autosomal  recessive disease among  populations, with a frequency of 1 in  Every 3,500 live births. Although it mainly affects the   Population anyone can request screening. If you have a family history of  cystic fibrosis you should request this test.  This screening is a blood  draw.      2.  Spinal Muscular Atrophy (SMA) is the most common inherited cause  of infant death.  The most common form of this disorder causes death by a age two.  One in every 6,000 to " "10,000 babies born in the [place] has SMA.      3.  Fragile X Syndrome (FXS) is the most common inherited cause of  intellectual disability.  Approximately 1 in every 3,600 boys and 1 in every  6,000 girls is born with FXS.      4.  Hemoglobinopathy Evaluation:  Hemoglobin electrophoresis is a  non-invasive blood test that looks at abnormal hemoglobin (component of  red blood cells) production. Examples of this include sickle cell anemia  (which is a disorder of the red blood cells and their shape) and the  thalassemia s (which is also a form of anemia).  High risk groups include:   , Southeast Asians, , Mediterranean, Pakistani,  South and Central American and [place] descent. This is a one-time  test.     5. Harmeet-Sachs (optional):  Is a disorder that results when an enzyme that            helps break down fatty substances is absent.  This is a fatal disorder.                This is most commonly passed from parents who are of Ashkenazi Mu-ism  descent. One in four to one in five individuals of Ashkenazi Mu-ism  descent carry a mutation for one of the autosomal recessive disorders  included in a group of disorders sometimes call \"Mu-ism genetic  Disorders\".      **If you are a carrier of CF, SMA, or a hemoglobinopathy, your partner will also need to be tested. This partner testing is offered at a reduced cost.  This screen can be done prior to pregnancy or at any time during the pregnancy.      Maternal Serum AFP  The screening is ideally done between 15 and 18 weeks of pregnancy but can be done up to week 24. Even if you have done the Innatal testing you can still get a single AFP drawn to check for neural tube defects like Spina Bifida.       Anatomy Ultrasound:    This is a fetal anatomy survey done around 20 weeks gestation.  This ultrasound will look at the heart structure, heart activity, fetal heart rate and rhythm, assessment of the amniotic fluid volume, placenta appearance and location, the " umbilical cord and placental insertion site.  They also do many fetal measurements to make sure the baby is growing properly.  The cervical length is also measured and fetal movement is evaluated.  The gender of the baby can usually be determined.      In the event of a positive screen:    If any screening tests come back with an increased risk, you will be referred to Maternal Fetal Medicine to be seen by a genetic counselor and a doctor.  They will assess your risk and see if further diagnostic testing is warranted.  The options for diagnosis that may be offered are: chorionic villus sampling (CVS), usually done at 11 to 12 weeks of pregnancy and amniocentesis which is generally done later in pregnancy around 15 to 20 weeks.  All risks/benefits would be explained and you can decide what course of action is best for you and your family.    Why you might choose to screen?    A desire to know as much as you can about your baby    If your baby had a genetic abnormality you can learn about it before they are born    Choose whether to continue the pregnancy or to terminate    Why you might choose to NOT screen?    You feel that whatever happens is fine and you would not terminate    You know you don't want to do any diagnostic tests even if the screening test showed a high possibility of a genetic abnormality      For further information please call:  [facility]   [phone]          Over-the-counter (OTC) medications during pregnancy    Make sure to follow package directions for dosing and information unless otherwise noted on the list.    Morning sickness/nausea:      Unisom (doxylamine) 12.5 mg (1/2 tab) and Vitamin B6 25-50 mg three times daily. Unisom may cause some drowsiness as it is typically used for sleep. The combination of these medications can be very effective but they can also be taken separately    Dramamine (Dimenhydrinate) 25-50 mg every 4-6 hours as needed    Benadryl  (diphenhydramine) 25-50 mg every 4-6 hours     Kalee tablets 1000 mg per day in divided doses    Constipation:      Colace (Docusate sodium)    Metamucil    Citrucel    Milk of magnesia    Fibercon    Miralax (if all other methods have failed)    Diarrhea:    Imodium (loperamide)    Heartburn:      Zantac (ranitidine)    Pepcid (famotidine)    Prilosec (omeprazole)    Antacids-Tums, Maalox (liquid or tablets), Rolaids  Pepto Bismol and [place] are NOT RECOMMENDED for use during pregnancy because they contain aspirin    Hemorrhoids:      Tucks pads/ointment    Anusol/Anusol-HC    Preparation H    Gas Pain  Simethicone (Gas-X, Mylanta Gas, Mylicon)      Cough, cold, and congestion:      Robitussin (dextromethorphan) and Robitussin DM (dextromethorphan and guaifenesin)    Cough drops/zinc lozenges    Mucinex    Sudafed (after 16 weeks gestation)    Vicks Vapo rub  Cough medicine with alcohol like Nyquil is not recommended during pregnancy    Headache:      Acetaminophen (Tylenol) 650 mg every 4-6 hours or 1000 mg every 6 hours, do not exceed 4000 mg in 24 hours   Ibuprofen (Advil/Motrin) and Naproxen (Aleve) are not recommended during the 1st or 3rd trimester of pregnancy    Allergy:      Benadryl (diphenhydramine)    Zyrtec (cetirizine)    Claritin (loratadine)    Rash/Itching:      Benadryl lotion    Cortaid cream (Hydrocortisone cream)    Benadryl (diphenhydramine)    Vaginal yeast infection:      Monistat 3 or 7 day    Gyne-Lotrimin    Acne:      Benzoyl Peroxide    Salicylic acid     If you have questions or concerns about any medications that are available OTC or you are unsure if something is safe call:    [facility]  [phone]

## 2019-08-31 LAB
BACTERIA SPEC CULT: NO GROWTH
Lab: NORMAL
RUBV IGG SERPL IA-ACNC: 27 IU/ML
SPECIMEN SOURCE: NORMAL
T PALLIDUM AB SER QL: NONREACTIVE

## 2019-08-31 ASSESSMENT — ANXIETY QUESTIONNAIRES: GAD7 TOTAL SCORE: 2

## 2019-09-03 LAB
DEPRECATED CALCIDIOL+CALCIFEROL SERPL-MC: 25 UG/L (ref 20–75)
HBV SURFACE AG SERPL QL IA: NONREACTIVE
HIV 1+2 AB+HIV1 P24 AG SERPL QL IA: NONREACTIVE

## 2019-09-09 ENCOUNTER — TELEPHONE (OUTPATIENT)
Dept: OBGYN | Facility: CLINIC | Age: 29
End: 2019-09-09

## 2019-09-09 DIAGNOSIS — O09.91 HIGH-RISK PREGNANCY IN FIRST TRIMESTER: ICD-10-CM

## 2019-09-09 NOTE — TELEPHONE ENCOUNTER
Innatal results: Negative  Pre Parent Carrier Screening: Negative      TEST RESULT INTERPRETATION   Chromosome 21 No aneuploidy detected  Results consistent with two copies of chromosome 21   Chromosome 18 No aneuploidy detected  Results consistent with two copies of chromosome 18   Chromosome 13 No aneuploidy detected  Results consistent with two copies of chromosome 13   Sex Chromosome No aneuploidy detected  Results consistent with two sex chromosomes:  male     Pt informed. Sister informed of sex of baby per pt's instruction.

## 2019-09-10 LAB
LAB SCANNED RESULT: NORMAL
LAB SCANNED RESULT: NORMAL

## 2019-09-27 ENCOUNTER — PRENATAL OFFICE VISIT (OUTPATIENT)
Dept: MIDWIFE SERVICES | Facility: CLINIC | Age: 29
End: 2019-09-27
Payer: COMMERCIAL

## 2019-09-27 VITALS — WEIGHT: 158.8 LBS | BODY MASS INDEX: 26.63 KG/M2 | DIASTOLIC BLOOD PRESSURE: 64 MMHG | SYSTOLIC BLOOD PRESSURE: 114 MMHG

## 2019-09-27 DIAGNOSIS — O09.92 HIGH-RISK PREGNANCY IN SECOND TRIMESTER: Primary | ICD-10-CM

## 2019-09-27 DIAGNOSIS — Z13.79 GENETIC SCREENING: ICD-10-CM

## 2019-09-27 DIAGNOSIS — Z11.3 ROUTINE SCREENING FOR STI (SEXUALLY TRANSMITTED INFECTION): ICD-10-CM

## 2019-09-27 DIAGNOSIS — Z3A.15 15 WEEKS GESTATION OF PREGNANCY: ICD-10-CM

## 2019-09-27 PROCEDURE — 87491 CHLMYD TRACH DNA AMP PROBE: CPT | Performed by: NURSE PRACTITIONER

## 2019-09-27 PROCEDURE — 87591 N.GONORRHOEAE DNA AMP PROB: CPT | Performed by: NURSE PRACTITIONER

## 2019-09-27 PROCEDURE — 99207 ZZC PRENATAL VISIT: CPT | Performed by: ADVANCED PRACTICE MIDWIFE

## 2019-09-27 NOTE — PATIENT INSTRUCTIONS
Constipation    Constipation can be caused by many factors such as poor diet, lack of activity, and medications. Often times women experience more constipation during pregnancy due to decreased intestinal motility.    DRINK TONS OF WATER! 2.5 liters (4 pints) of water per day      Activity is very important. Increase your daily activity to at least 20-60 minutes. This increases blood flow to your gut and improves bowel function    Limit caffeine and alcohol intake    Avoid foods you've identified as constipating    Increase fiber intake: there are ways to increase you fiber through your diet but there are also OTC agents such as Metamucil or Benfiber that you can supplement your diet with    Use probiotics such as Florastor and/or prebiotics such as oligosaccharides    Consider using an Omega 3 supplement, flaxseed and aroldo seeds are great sources    Plan adequate time for elimination, it is most effective right after activity, and it may be beneficial to plan a consistent time daily    Food Suggestions      Eat beans, nuts, whole grain breads and cereals, oats, barley, figs, apples with skin, raisins, green leafy vegetables, fresh and dried fruits (especially grapes), prunes or prune juice    Eat popcorn nightly    Eat 2-3 salads per day    Add a pinch of cayenne pepper to food    1-2 tbsp of olive oil per day    Lemon juice and/or honey in warm water every morning before breakfast    Decrease red meat, refined white flour products like white rice, white bread and pasta    Tea infusions of: rosemary, chamomile, lemon balm, senna leaf, alfalfa, fennel seeds, lavender, cascara, dandelion, licorice root tea, psyllium seeds, marshmallow root    Other remedies      Increase Vitamin B and E (800 IU if not pregnant, 400 IU if pregnant per day) and potassium, calcium, and magnesium supplements      If these remedies fail, medications are an option as well. Please talk with your midwife if you continue to struggle with  constipation. If you are pregnant please double check with us before starting any medications!    Fiber Facts    A daily intake of about 25-30 grams of fiber is recommended. Most Americans only get about 12 grams of fiber on average. Fiber is very important in the diet to promote bowel regularity, lower cholesterol, lower risk of developing type 2 diabetes, and decrease chance of weight gain.  In pregnancy your intestinal motility slows down, so fiber is even more important.    Start gradually increasing fiber intake to reduce the risk of bloating and gas. Increase by about 5 grams a day every few days until you reach your fiber goals!    Food      Amount   Grams of Fiber  Grains  [place]'s All Bran Cereal   1/2 cup   10  Natural Oven's Stay Trim Bread 1 slice    5  Brown Rice (cooked)  1 cup    4  Cheerios    1 cup    3  Whole Wheat Crackers  5 crackers   3.5  Rye crackers    3 crackers   3    Cereals and whole grains are excellent ways to increase fiber in your diet. Try to find cereals that have at least 8 grams of fiber per serving.    Fruits  Blackberries     1 cup    7   Figs      3 dried   7  Apple      1     4  Pear     1    4   Orange    1    3    Vegetables  Winter squash   1 cup    9  Broccoli     1 cup    4  Corn      1 cup    4  Swiss chard (cooked)  1 cup     4  Cauliflower     1 cup     3  Potato     1 large w/skin  5    Beans  Kidney, red    1/2 cup   8  Lentils     1/2 cup cooked  8  Black     1/2 cup    7  Navy     1/2 cup   7  Fiore     1/2 cup   7  White      1/2 cup   6  Garbanzo/Chickpeas  1/2 cup   5

## 2019-09-29 LAB
C TRACH DNA SPEC QL NAA+PROBE: NEGATIVE
N GONORRHOEA DNA SPEC QL NAA+PROBE: NEGATIVE
SPECIMEN SOURCE: NORMAL
SPECIMEN SOURCE: NORMAL

## 2019-10-16 NOTE — PATIENT INSTRUCTIONS
Fiber Facts    A daily intake of about 25-30 grams of fiber is recommended. Most Americans only get about 12 grams of fiber on average. Fiber is very important in the diet to promote bowel regularity, lower cholesterol, lower risk of developing type 2 diabetes, and decrease chance of weight gain.  In pregnancy your intestinal motility slows down, so fiber is even more important.    Start gradually increasing fiber intake to reduce the risk of bloating and gas. Increase by about 5 grams a day every few days until you reach your fiber goals!    Food      Amount   Grams of Fiber  Grains  Shawn's All Bran Cereal   1/2 cup   10  Natural Oven's Stay Trim Bread 1 slice    5  Brown Rice (cooked)  1 cup    4  Cheerios    1 cup    3  Whole Wheat Crackers  5 crackers   3.5  Rye crackers    3 crackers   3    Cereals and whole grains are excellent ways to increase fiber in your diet. Try to find cereals that have at least 8 grams of fiber per serving.    Fruits  Blackberries     1 cup    7   Figs      3 dried   7  Apple      1     4  Pear     1    4   Orange    1    3    Vegetables  Winter squash   1 cup    9  Broccoli     1 cup    4  Corn      1 cup    4  Swiss chard (cooked)  1 cup     4  Cauliflower     1 cup     3  Potato     1 large w/skin  5    Beans  Kidney, red    1/2 cup   8  Lentils     1/2 cup cooked  8  Black     1/2 cup    7  Navy     1/2 cup   7  Fiore     1/2 cup   7  White      1/2 cup   6  Garbanzo/Chickpeas  1/2 cup   5        Round Ligament Pain    Pregnancy can entail many normal discomforts. One of those discomforts may be round ligament pain. Round ligament pain occurs as your uterus and your baby grow and the muscles begin to stretch more in your abdomen. Round ligament pain is normal and not dangerous but can be very uncomfortable      Round ligament pain is typically described as an unpleasant sensation that ranges from a sharp knifelike pain to dull intermittent pain in the lower abdominal/suprapubic  area of a pregnant woman      Virtually all pregnant women will experience this pain at some point during their pregnancies      It typically manifests between 16 and 20 weeks of pregnancy and can be incredibly bothersome especially for women who remain very active during their pregnancies       Please discuss with your midwife if you are having this type of pain; sometimes the pain can be associated with other medical conditions so it is important for us to assess you just to make sure    Comfort measures    There are certain things you can do to cope with round ligament pain and ease the discomfort you are having      Pregnancy support belt      Tylenol      Hot/ice packs      Baths       Exercise such as yoga and swimming that help stretch muscles      Prenatal massage      Reflexology to waist and pelvic joints       Positioning such as side-lying and hands and knees, make sure your abdomen is  well supported with pillows while doing different positions

## 2019-10-16 NOTE — PROGRESS NOTES
Feels well.  Happy to see US today!  Fetal movement: negative   Denies loss of fluid/vb/contractions  Anatomy ultrasound results discussed  having a Boy, Placenta:  Posterior and low lying  GCT visit between 24-28 weeks, handout provided, reminded of longer appointment  Is interested in waterbirth, agreement given for review   Round ligament pain and comfort measures reviewed  Plan for f/u US in 4 weeks with OB visit  Return to clinic 4 weeks    Eli AGARWAL CNM

## 2019-10-18 ENCOUNTER — PRENATAL OFFICE VISIT (OUTPATIENT)
Dept: MIDWIFE SERVICES | Facility: CLINIC | Age: 29
End: 2019-10-18
Payer: COMMERCIAL

## 2019-10-18 ENCOUNTER — ANCILLARY PROCEDURE (OUTPATIENT)
Dept: ULTRASOUND IMAGING | Facility: CLINIC | Age: 29
End: 2019-10-18
Payer: COMMERCIAL

## 2019-10-18 VITALS — BODY MASS INDEX: 27.5 KG/M2 | DIASTOLIC BLOOD PRESSURE: 70 MMHG | WEIGHT: 164 LBS | SYSTOLIC BLOOD PRESSURE: 104 MMHG

## 2019-10-18 DIAGNOSIS — O09.92 HIGH-RISK PREGNANCY IN SECOND TRIMESTER: ICD-10-CM

## 2019-10-18 DIAGNOSIS — O44.42 LOW LYING PLACENTA NOS OR WITHOUT HEMORRHAGE, SECOND TRIMESTER: ICD-10-CM

## 2019-10-18 DIAGNOSIS — Z3A.18 18 WEEKS GESTATION OF PREGNANCY: ICD-10-CM

## 2019-10-18 DIAGNOSIS — O09.92 HIGH-RISK PREGNANCY IN SECOND TRIMESTER: Primary | ICD-10-CM

## 2019-10-18 DIAGNOSIS — Z86.19 HISTORY OF PCR DNA POSITIVE FOR HSV1: ICD-10-CM

## 2019-10-18 PROCEDURE — 76805 OB US >/= 14 WKS SNGL FETUS: CPT | Performed by: OBSTETRICS & GYNECOLOGY

## 2019-10-18 PROCEDURE — 99207 ZZC PRENATAL VISIT: CPT | Performed by: NURSE PRACTITIONER

## 2019-11-14 ENCOUNTER — PRENATAL OFFICE VISIT (OUTPATIENT)
Dept: MIDWIFE SERVICES | Facility: CLINIC | Age: 29
End: 2019-11-14
Payer: COMMERCIAL

## 2019-11-14 ENCOUNTER — ANCILLARY PROCEDURE (OUTPATIENT)
Dept: ULTRASOUND IMAGING | Facility: CLINIC | Age: 29
End: 2019-11-14
Payer: COMMERCIAL

## 2019-11-14 VITALS — DIASTOLIC BLOOD PRESSURE: 60 MMHG | WEIGHT: 171.6 LBS | SYSTOLIC BLOOD PRESSURE: 100 MMHG | BODY MASS INDEX: 28.78 KG/M2

## 2019-11-14 DIAGNOSIS — O44.42 LOW LYING PLACENTA NOS OR WITHOUT HEMORRHAGE, SECOND TRIMESTER: ICD-10-CM

## 2019-11-14 DIAGNOSIS — Z87.39 HISTORY OF JUVENILE RHEUMATOID ARTHRITIS: ICD-10-CM

## 2019-11-14 DIAGNOSIS — O09.92 HIGH-RISK PREGNANCY IN SECOND TRIMESTER: ICD-10-CM

## 2019-11-14 DIAGNOSIS — O09.92 HIGH-RISK PREGNANCY IN SECOND TRIMESTER: Primary | ICD-10-CM

## 2019-11-14 DIAGNOSIS — Z98.82 HISTORY OF BREAST AUGMENTATION: ICD-10-CM

## 2019-11-14 DIAGNOSIS — Z3A.22 22 WEEKS GESTATION OF PREGNANCY: ICD-10-CM

## 2019-11-14 PROCEDURE — 99207 ZZC PRENATAL VISIT: CPT | Performed by: NURSE PRACTITIONER

## 2019-11-14 PROCEDURE — 76816 OB US FOLLOW-UP PER FETUS: CPT | Performed by: OBSTETRICS & GYNECOLOGY

## 2019-11-14 NOTE — PROGRESS NOTES
Feels well.  Excited to see baby again today!  Had US prior to visit d/t low lying placenta.  Fetal movement: positive   Denies loss of fluid/vb/contractions  US today shows placenta 2.3cm away from cervical os,  Awaiting recommendation from Dr. Patel  GCT visit between 24-28 weeks, handout provided, reminded of longer appointment  Round ligament pain and comfort measures reviewed  Return to clinic 4 weeks    Eli AGARWAL CNM

## 2019-11-14 NOTE — PATIENT INSTRUCTIONS
SIGNS OF  LABOR    Labor is  if it happens more than three weeks before your due date.    It can be hard to know if you are in labor, since the symptoms can be like the normal feelings of pregnancy.  Often, the only difference is the symptoms increase or they don't go away.     Signs of  labor can include:      Contractions which can feel like period cramps or gas pain.  You may feel it in the lower part of your abdomen, in your back, or as a pressure feeling in your bottom.  It is often regular, coming every 5 or 10 minutes, and  lasting about 30-60 seconds. Some contractions are normal during pregnancy ([place] izquierdo contractions) but if you are feeling more than 5-6 in one hour that is NOT normal    If this occurs empty your bladder, then drink 2-3 glasses of water, eat a snack, and lay down on your left side. Put your hand on your abdomen to count the contractions.  If after one hour of resting you have still had 5-6 contractions call your clinic right away.      If you feel a pop, gush, or trickle of fluid it may mean that your bag of water has broken and you should contact the clinic       You may also experience loose stools and/or rectal pressure       Listen to your body, if something doesn't seem right please call us at the clinic    Risk Factors      Previous  delivery    Bacterial Vaginosis- if you notice a fishy smell to your discharge or experience vaginal itching/discomfort you should be evaluated for infection    Smoking    Drug abuse    Adolescent (teen) pregnancy or advanced maternal age (AMA) age 35 and over    Dehydration (this may not cause  labor but it can cause contractions)    If you think you are in  labor we may do some lab testing in the clinic or send you to the hospital for evaluation    Please call us if you are concerned you are in  labor.    [facility]  [phone]    GESTATIONAL DIABETES SCREENING    All pregnant women  are screened at least once for diabetes as part of their prenatal care. A woman has gestational diabetes if she has high blood sugars for the first time during pregnancy.      Diabetes can harm your health and the health of your baby.  But if we find the diabetes early in pregnancy we will watch your health closely and prevent further problems.       We will check for gestational diabetes during your visit between 24-28 weeks visit. Please note you can not do this prior to 24 weeks of gestation.      Plan to spend about an hour at the clinic.  When you check in let us know that you will be having your diabetes screening that day.       We will give you a 50 gram glucose drink that you have 5 minutes to consume.  Exactly one hour later you will have draw blood from your arm to check your blood sugar level.      We will call you to let you know if your results are normal.  If the results are normal no more testing will be needed.  If your results are not normal we will discuss follow up testing with you.        You may eat prior to the testing but it is not recommended to eat or drink very sweet things such as pop, juice, candy or dessert type foods.  Eat a high protein, low carb meals prior to testing.    If you have any questions please call:    [facility]    [phone]

## 2019-12-03 DIAGNOSIS — Z36.9 ENCOUNTER FOR ANTENATAL SCREENING OF MOTHER: Primary | ICD-10-CM

## 2019-12-09 NOTE — PROGRESS NOTES
Feels well overall. Nighttime, has reflux. Sleeps sitting up, eating smaller, frequent meals.   Leg cramps. Interventions discussed  Fetal movement: positive   Denies loss of fluid/vb/contractions  GCT, CBC today, Hep C  Tdap next visit; reviewed CDC recommendations and partner/family vaccination recommended as well  Water birth discussed, patient is interested  Need for Rhogam? No, A+    Return to clinic 2 weeks    Roxana AGARWAL CNM

## 2019-12-11 ENCOUNTER — PRENATAL OFFICE VISIT (OUTPATIENT)
Dept: MIDWIFE SERVICES | Facility: CLINIC | Age: 29
End: 2019-12-11
Payer: COMMERCIAL

## 2019-12-11 VITALS — WEIGHT: 180.4 LBS | SYSTOLIC BLOOD PRESSURE: 112 MMHG | DIASTOLIC BLOOD PRESSURE: 58 MMHG | BODY MASS INDEX: 30.25 KG/M2

## 2019-12-11 DIAGNOSIS — Z3A.26 26 WEEKS GESTATION OF PREGNANCY: Primary | ICD-10-CM

## 2019-12-11 DIAGNOSIS — O09.92 HIGH-RISK PREGNANCY IN SECOND TRIMESTER: ICD-10-CM

## 2019-12-11 DIAGNOSIS — Z36.9 ENCOUNTER FOR ANTENATAL SCREENING OF MOTHER: ICD-10-CM

## 2019-12-11 LAB
ERYTHROCYTE [DISTWIDTH] IN BLOOD BY AUTOMATED COUNT: 12.8 % (ref 10–15)
GLUCOSE 1H P 50 G GLC PO SERPL-MCNC: 75 MG/DL (ref 60–129)
HCT VFR BLD AUTO: 34.8 % (ref 35–47)
HGB BLD-MCNC: 11.6 G/DL (ref 11.7–15.7)
MCH RBC QN AUTO: 32.6 PG (ref 26.5–33)
MCHC RBC AUTO-ENTMCNC: 33.3 G/DL (ref 31.5–36.5)
MCV RBC AUTO: 98 FL (ref 78–100)
PLATELET # BLD AUTO: 179 10E9/L (ref 150–450)
RBC # BLD AUTO: 3.56 10E12/L (ref 3.8–5.2)
WBC # BLD AUTO: 11.5 10E9/L (ref 4–11)

## 2019-12-11 PROCEDURE — 99207 ZZC PRENATAL VISIT: CPT | Performed by: ADVANCED PRACTICE MIDWIFE

## 2019-12-11 PROCEDURE — 86803 HEPATITIS C AB TEST: CPT | Performed by: ADVANCED PRACTICE MIDWIFE

## 2019-12-11 PROCEDURE — 36415 COLL VENOUS BLD VENIPUNCTURE: CPT | Performed by: ADVANCED PRACTICE MIDWIFE

## 2019-12-11 PROCEDURE — 82950 GLUCOSE TEST: CPT | Performed by: ADVANCED PRACTICE MIDWIFE

## 2019-12-11 PROCEDURE — 85027 COMPLETE CBC AUTOMATED: CPT | Performed by: ADVANCED PRACTICE MIDWIFE

## 2019-12-11 NOTE — NURSING NOTE
"Chief Complaint   Patient presents with     Prenatal Care     26 weeks 3 days, no questions or concerns. No c/o vaginal bleeding, cramping. Feeling fetal movement daily.       Initial /58 (BP Location: Right arm, Patient Position: Chair, Cuff Size: Adult Regular)   Wt 81.8 kg (180 lb 6.4 oz)   LMP 2019   BMI 30.25 kg/m   Estimated body mass index is 30.25 kg/m  as calculated from the following:    Height as of 19: 1.645 m (5' 4.75\").    Weight as of this encounter: 81.8 kg (180 lb 6.4 oz).  BP completed using cuff size: regular    Questioned patient about current smoking habits.  Pt. has never smoked.          The following HM Due: NONE      Aaron Gutierrez CMA               "

## 2019-12-11 NOTE — RESULT ENCOUNTER NOTE
Patient informed of results via [employer], to call back with any questions or concerns.    STEFANO Smallwood, CNM

## 2019-12-12 LAB — HCV AB SERPL QL IA: NONREACTIVE

## 2019-12-22 NOTE — PROGRESS NOTES
Feels well, here alone today. Traveling to [place] next week for her baby shower.  Having a lot of reflux, need to sleep sitting up. Was monitoring food triggers and noticed dairy to be a main contributor, has cut out almost all dairy. Wants to avoid any medications. Reviewed other comfort measures and lifestyle changes.   Fetal movement: positive, denies loss of fluid/vb/contractions  Tdap given: Yes, given today  Rhogam: No, A+ blood type   Anti Treponema drawn: No, pt declined   Hep C drawn: Yes, negative  Water birth consent signed: No, will bring with next time   Reviewed PTL precautions and S&S of [facility], patient verbalizes understanding and what to report  Hospital Registration reminder-online  Return to clinic 2 weeks    STEFANO Smallwood, CNM

## 2019-12-22 NOTE — PATIENT INSTRUCTIONS
"PREECLAMPSIA SIGNS AND SYMPTOMS    Preeclampsia is a dangerous condition that some women develop in the second half of pregnancy. It can also begin after the baby is born.  Preeclampsia causes high blood pressure and can cause problems with many organ systems in your body.  It can also affect the growth of your baby. The exact cause of preeclampsia is unknown, however, there are signs and symptoms to watch for:    -A bad headache that doesn't improve with Tylenol  -Visual changes such as spots, flashes of light, blurry vision  -Pain in the upper right part of your abdomen, especially under the ribs that doesn't go away  -Nausea and/or vomiting  -Feeling extremely tired  -Yellowing of the skin and/or eyes  -Feeling \"not quite right\" or that something is wrong  -An extreme amount of swelling (some swelling in pregnancy is very normal)    If your midwife feels that you are developing preeclampsia, you will have lab tests drawn and will be monitored very closely.     If you are experiencing anyof these symptoms, call the [facility] immediately at [phone].    SIGNS OF  LABOR    Labor is  if it happens more than three weeks before your due date.    It can be hard to know if you are in labor, since the symptoms can be like the normal feelings of pregnancy.  Often, the only difference is the symptoms increase or they don't go away.     Signs of  labor can include:      Contractions which can feel like period cramps or gas pain.  You may feel it in the lower part of your abdomen, in your back, or as a pressure feeling in your bottom.  It is often regular, coming every 5 or 10 minutes, and  lasting about 30-60 seconds. Some contractions are normal during pregnancy ([place] izquierdo contractions) but if you are feeling more than 5-6 in one hour that is NOT normal    If this occurs empty your bladder, then drink 2-3 glasses of water, eat a snack, and lay down on your left side. Put your " hand on your abdomen to count the contractions.  If after one hour of resting you have still had 5-6 contractions call your clinic right away.      If you feel a pop, gush, or trickle of fluid it may mean that your bag of water has broken and you should contact the clinic       You may also experience loose stools and/or rectal pressure       Listen to your body, if something doesn't seem right please call us at the clinic    Risk Factors      Previous  delivery    Bacterial Vaginosis- if you notice a fishy smell to your discharge or experience vaginal itching/discomfort you should be evaluated for infection    Smoking    Drug abuse    Adolescent (teen) pregnancy or advanced maternal age (AMA) age 35 and over    Dehydration (this may not cause  labor but it can cause contractions)    If you think you are in  labor we may do some lab testing in the clinic or send you to the hospital for evaluation    Please call us if you are concerned you are in  labor.    [facility]  [phone]

## 2019-12-26 ENCOUNTER — PRENATAL OFFICE VISIT (OUTPATIENT)
Dept: MIDWIFE SERVICES | Facility: CLINIC | Age: 29
End: 2019-12-26
Payer: COMMERCIAL

## 2019-12-26 VITALS
HEART RATE: 80 BPM | SYSTOLIC BLOOD PRESSURE: 120 MMHG | WEIGHT: 182 LBS | DIASTOLIC BLOOD PRESSURE: 66 MMHG | BODY MASS INDEX: 30.52 KG/M2

## 2019-12-26 DIAGNOSIS — Z3A.28 28 WEEKS GESTATION OF PREGNANCY: ICD-10-CM

## 2019-12-26 DIAGNOSIS — Z98.82 HISTORY OF BREAST AUGMENTATION: ICD-10-CM

## 2019-12-26 DIAGNOSIS — Z23 NEED FOR TDAP VACCINATION: Primary | ICD-10-CM

## 2019-12-26 DIAGNOSIS — Z87.39 HISTORY OF JUVENILE RHEUMATOID ARTHRITIS: ICD-10-CM

## 2019-12-26 DIAGNOSIS — O09.92 HIGH-RISK PREGNANCY IN SECOND TRIMESTER: ICD-10-CM

## 2019-12-26 PROCEDURE — 99207 ZZC PRENATAL VISIT: CPT | Performed by: ADVANCED PRACTICE MIDWIFE

## 2019-12-26 PROCEDURE — 90471 IMMUNIZATION ADMIN: CPT | Performed by: ADVANCED PRACTICE MIDWIFE

## 2019-12-26 PROCEDURE — 90715 TDAP VACCINE 7 YRS/> IM: CPT | Performed by: ADVANCED PRACTICE MIDWIFE

## 2020-01-07 ENCOUNTER — PRENATAL OFFICE VISIT (OUTPATIENT)
Dept: MIDWIFE SERVICES | Facility: CLINIC | Age: 30
End: 2020-01-07
Payer: COMMERCIAL

## 2020-01-07 VITALS — BODY MASS INDEX: 31.56 KG/M2 | DIASTOLIC BLOOD PRESSURE: 62 MMHG | WEIGHT: 188.2 LBS | SYSTOLIC BLOOD PRESSURE: 116 MMHG

## 2020-01-07 DIAGNOSIS — O09.92 HIGH-RISK PREGNANCY IN SECOND TRIMESTER: ICD-10-CM

## 2020-01-07 PROCEDURE — 99207 ZZC PRENATAL VISIT: CPT | Performed by: ADVANCED PRACTICE MIDWIFE

## 2020-01-07 NOTE — PATIENT INSTRUCTIONS

## 2020-01-07 NOTE — PROGRESS NOTES
"Still having reflux. Has been cutting out certain foods. Sleeps sitting up. Trouble bending over to put shoes on, has heartburn irritation.   Has not been taking any medications. Does not want to \"risk it\" with the Zantac recall.   Discussed medication options.   Fetal Movement: positive, denies loss of fluid/vb, no contractions  Denies questions about fetal kick counts   Denies  Questions about PTL precautions and s/sx of preeclampsia; denies any S&S and aware of what to report  Flying to [place] tomorrow. Precautions discussed.     Return to clinic in 2 weeks    Roxana AGARWAL CNM                "

## 2020-01-22 ENCOUNTER — ALLIED HEALTH/NURSE VISIT (OUTPATIENT)
Dept: NURSING | Facility: CLINIC | Age: 30
End: 2020-01-22
Payer: COMMERCIAL

## 2020-01-22 ENCOUNTER — PRENATAL OFFICE VISIT (OUTPATIENT)
Dept: MIDWIFE SERVICES | Facility: CLINIC | Age: 30
End: 2020-01-22
Payer: COMMERCIAL

## 2020-01-22 VITALS — DIASTOLIC BLOOD PRESSURE: 76 MMHG | SYSTOLIC BLOOD PRESSURE: 117 MMHG | HEART RATE: 94 BPM

## 2020-01-22 VITALS — SYSTOLIC BLOOD PRESSURE: 117 MMHG | WEIGHT: 187 LBS | BODY MASS INDEX: 31.36 KG/M2 | DIASTOLIC BLOOD PRESSURE: 76 MMHG

## 2020-01-22 DIAGNOSIS — O09.92 HIGH-RISK PREGNANCY IN SECOND TRIMESTER: ICD-10-CM

## 2020-01-22 DIAGNOSIS — Z36.89 NST (NON-STRESS TEST) REACTIVE: Primary | ICD-10-CM

## 2020-01-22 PROCEDURE — 99207 ZZC PRENATAL VISIT: CPT | Performed by: ADVANCED PRACTICE MIDWIFE

## 2020-01-22 PROCEDURE — 59025 FETAL NON-STRESS TEST: CPT

## 2020-01-22 NOTE — PROGRESS NOTES
Patient here for NST per order from [facility]  External monitors placed after explanation and consent from patient about the procedure.  Denies contractions or cramping, LOF or VB.  Reports active FM    NST INTERPRETATION    Baseline Rate: 140  Accelerations: present  Decelerations: none  Reactivity confirmed after strip reviewed by Roxana Mckeon    Patient brought to room for OV.  Lila Mckenzie RN on 1/22/2020 at 10:16 AM

## 2020-01-22 NOTE — PATIENT INSTRUCTIONS

## 2020-01-22 NOTE — PROGRESS NOTES
Came in initially for decreased FM. NST results below. NST reactive for 32 weeks. Discussed reassurance with Lilia. Had an appt on Friday, will use today as her 32 week visit, instead.     Feels well. Reflux has improved.   Hand pain, stiffness.   Struggling with constipation. Aware or medication options try.   Fetal Movement: positive, denies loss of fluid/vb, no contractions  Fetal kick counts/movement reviewed  Reviewed PTL precautions and s/sx of preeclampsia; denies any S&S and aware of what to report     Peds chosen: Yes  Pediatrician: Hep B, Vit K, Erythromycin and circumcision; advised to check insurance for hospital vs clinic    Plans to breastfeed Yes  Breastfeeding class planned No     Return to clinic 2 weeks      Fetal Non-Stress Test   Fetal heart tones:   Baseline 140  Variability: Moderate  Accelerations: Present  Decelerations:  None  Category 1  fetal heart rate tracing    NST Interpretation: reactive  Fetal kick counts reviewed. AVS provided       Roxana AGARWAL CNM

## 2020-02-03 NOTE — PATIENT INSTRUCTIONS
SIGNS OF  LABOR    Labor is  if it happens more than three weeks before your due date.    It can be hard to know if you are in labor, since the symptoms can be like the normal feelings of pregnancy.  Often, the only difference is the symptoms increase or they don't go away.     Signs of  labor can include:      Contractions which can feel like period cramps or gas pain.  You may feel it in the lower part of your abdomen, in your back, or as a pressure feeling in your bottom.  It is often regular, coming every 5 or 10 minutes, and  lasting about 30-60 seconds. Some contractions are normal during pregnancy ([place] izquierdo contractions) but if you are feeling more than 5-6 in one hour that is NOT normal    If this occurs empty your bladder, then drink 2-3 glasses of water, eat a snack, and lay down on your left side. Put your hand on your abdomen to count the contractions.  If after one hour of resting you have still had 5-6 contractions call your clinic right away.      If you feel a pop, gush, or trickle of fluid it may mean that your bag of water has broken and you should contact the clinic       You may also experience loose stools and/or rectal pressure       Listen to your body, if something doesn't seem right please call us at the clinic    Risk Factors      Previous  delivery    Bacterial Vaginosis- if you notice a fishy smell to your discharge or experience vaginal itching/discomfort you should be evaluated for infection    Smoking    Drug abuse    Adolescent (teen) pregnancy or advanced maternal age (AMA) age 35 and over    Dehydration (this may not cause  labor but it can cause contractions)    If you think you are in  labor we may do some lab testing in the clinic or send you to the hospital for evaluation    Please call us if you are concerned you are in  labor.    [facility]  [phone]    PREECLAMPSIA SIGNS AND SYMPTOMS    Preeclampsia is a  "dangerous condition that some women develop in the second half of pregnancy. It can also begin after the baby is born.  Preeclampsia causes high blood pressure and can cause problems with many organ systems in your body.  It can also affect the growth of your baby. The exact cause of preeclampsia is unknown, however, there are signs and symptoms to watch for:    -A bad headache that doesn't improve with Tylenol  -Visual changes such as spots, flashes of light, blurry vision  -Pain in the upper right part of your abdomen, especially under the ribs that doesn't go away  -Nausea and/or vomiting  -Feeling extremely tired  -Yellowing of the skin and/or eyes  -Feeling \"not quite right\" or that something is wrong  -An extreme amount of swelling (some swelling in pregnancy is very normal)    If your midwife feels that you are developing preeclampsia, you will have lab tests drawn and will be monitored very closely.     If you are experiencing anyof these symptoms, call the [facility] immediately at [phone].  Fetal Kick Counts    It is important to know when your baby's movements occur. We often get busy with work and life and do not pay close attention to their movements.        Women typically begin feeling movement between 18-22 weeks of gestation, sometimes it can be earlier or later depending on where your placenta is       Movements usually begin feeling like popping or fluttering and as the baby grows they become more pronounced    Toward the end of pregnancy as the baby gets larger they may not move as much or make as big of movements. Babies have maturing sleep cycles as well as not as much room to move and flip. If you are ever concerned about your baby's movements or have not felt the baby move for a while, we recommend you do a fetal kick count. Prior to starting your count drink a glass of water or juice and eat a snack. Then lay down on your side and begin to count movements.     How to do a " Fetal Kick Counts    There are many different ways to monitor your baby's movements. Movements can range from large jabs to small kicks, or wiggles.  Hiccups count!      Count 10 movements in 2 hours when resting and focusing    Count 10 movements in 12 hours when doing normal activity    We recommend that if movements occur but seem decreased that you should be seen in the clinic or hospital for evaluation within 12 hours. If fetal movement is absent or fetal kick counts are low please contact us right away.    If you ever have any concerns about your baby's movements DO NOT HESITATE to call us, we are here for you!    [facility] [place] [facility]  [phone]      GROUP B STREP    Group B Strep (GBS) is a common bacteria that is sometimes found in the vagina, urinary tract or rectum.  It is not harmful typically to adults but can cause serious illness in newborns.  It occasionally is passed from mother to baby during birth.   It is important to test in pregnancy.  When a woman is found to be positive for GBS, either at the first prenatal visit or by taking a culture at 36 weeks, treatment will be offered to reduce the chance of spreading the bacteria to the baby.       Treatment consists of either oral antibiotics early in pregnancy or antibiotics given by IV during labor if testing is positive at 36 weeks.      Even without treatment the baby rarely (1-2% of the time) gets infected.  With treatment the baby almost never gets infected.       There really isn't anything you can do to keep from getting or being positive for GBS.  It isn't sexually transmitted and there are no symptoms if you are positive.       Your midwife will discuss your results with you and make recommendations for treatment.                                                   Infant Car Seat Safety   [place] law requires that all infants must be secured into a car seat while in a car.    General Car Seat Guidelines:            Your car seat  should be no more than six years old and should never have been in an accident.  Always know the history of your car seat.            Car seats should be installed in the back seat and should be rear-facing. The American Academy of Pediatrics recommends that all infants and toddlers should remain rear-facing for as long as possible, until they reach the weight or height limits allowed by their car seat. Follow your car seat 's instructions.            The straps on the car seat should be snug enough that you should not be able to pinch up slack with your fingers.            Never dress your baby in thick clothing, coats or blankets while riding in a car seat.  Secure the baby into the seat wearing clothing of normal thickness and then cover baby s lap with a blanket OVER the car seat straps, if needed.  Car Seat Clinics and Safety Information:            Schedule an appointment to have a professional check the installation of your car seat before your baby is born.    Go to: https://[place]ps.[place].gov/divisions/ots/child-passenger-safety/Pages/car-seat-checks.aspx         and click on your county.            Sign up for email alerts on child restraint/car seat recalls at:     http://www-tony.nhtsa.dot.gov/subscriptions/index.cfm             Find more information on car seat safety at:     http://www.safercar.gov/parents/CarSeats/Car-Seat-Safety.htm

## 2020-02-03 NOTE — PROGRESS NOTES
Feels well. Here alone today.   Had a nice time visiting mom in [place], had her baby shower. Did 3D US while in [place] and reports baby was transverse   Reports malodorous urine for the past week. Denies painful or burning urination.   Fetal Movement: positive, denies loss of fluid/vb, some BH contractions  Fetal kick counts/movement reviewed    Reviewed PTL precautions and s/sx of preeclampsia; denies any S&S and aware of what to report     Taking hospital tour?  Yes, already did   Have car seat Yes  Discussed GBS/cx check at next visit  Waterbirth consent signed today, to be scanned into records  UA/UC collected, will treat as necessary  Return to clinic 2 weeks    STEFANO Smallwood, CNM

## 2020-02-05 ENCOUNTER — PRENATAL OFFICE VISIT (OUTPATIENT)
Dept: MIDWIFE SERVICES | Facility: CLINIC | Age: 30
End: 2020-02-05
Payer: COMMERCIAL

## 2020-02-05 VITALS — SYSTOLIC BLOOD PRESSURE: 112 MMHG | DIASTOLIC BLOOD PRESSURE: 80 MMHG | WEIGHT: 191.8 LBS | BODY MASS INDEX: 32.16 KG/M2

## 2020-02-05 DIAGNOSIS — Z87.39 HISTORY OF JUVENILE RHEUMATOID ARTHRITIS: ICD-10-CM

## 2020-02-05 DIAGNOSIS — Z3A.34 34 WEEKS GESTATION OF PREGNANCY: ICD-10-CM

## 2020-02-05 DIAGNOSIS — O09.92 HIGH-RISK PREGNANCY IN SECOND TRIMESTER: Primary | ICD-10-CM

## 2020-02-05 DIAGNOSIS — Z86.19 HISTORY OF PCR DNA POSITIVE FOR HSV1: ICD-10-CM

## 2020-02-05 DIAGNOSIS — R82.90 MALODOROUS URINE: ICD-10-CM

## 2020-02-05 LAB
ALBUMIN UR-MCNC: NEGATIVE MG/DL
APPEARANCE UR: CLEAR
BILIRUB UR QL STRIP: NEGATIVE
COLOR UR AUTO: YELLOW
GLUCOSE UR STRIP-MCNC: NEGATIVE MG/DL
HGB UR QL STRIP: NEGATIVE
KETONES UR STRIP-MCNC: NEGATIVE MG/DL
LEUKOCYTE ESTERASE UR QL STRIP: NEGATIVE
NITRATE UR QL: NEGATIVE
PH UR STRIP: 7 PH (ref 5–7)
SOURCE: NORMAL
SP GR UR STRIP: 1.02 (ref 1–1.03)
UROBILINOGEN UR STRIP-ACNC: 0.2 EU/DL (ref 0.2–1)

## 2020-02-05 PROCEDURE — 99207 ZZC PRENATAL VISIT: CPT | Performed by: ADVANCED PRACTICE MIDWIFE

## 2020-02-05 PROCEDURE — 81003 URINALYSIS AUTO W/O SCOPE: CPT | Performed by: ADVANCED PRACTICE MIDWIFE

## 2020-02-05 PROCEDURE — 87086 URINE CULTURE/COLONY COUNT: CPT | Performed by: ADVANCED PRACTICE MIDWIFE

## 2020-02-06 LAB
BACTERIA SPEC CULT: NO GROWTH
Lab: NORMAL
SPECIMEN SOURCE: NORMAL

## 2020-02-06 NOTE — RESULT ENCOUNTER NOTE
Patient informed of negative UC results via MyChart, to call back with any questions or concerns.    STEFANO Smallwood, CNM

## 2020-02-19 ENCOUNTER — PRENATAL OFFICE VISIT (OUTPATIENT)
Dept: OBGYN | Facility: CLINIC | Age: 30
End: 2020-02-19
Payer: COMMERCIAL

## 2020-02-19 VITALS — WEIGHT: 198 LBS | DIASTOLIC BLOOD PRESSURE: 76 MMHG | BODY MASS INDEX: 33.2 KG/M2 | SYSTOLIC BLOOD PRESSURE: 112 MMHG

## 2020-02-19 DIAGNOSIS — O26.43 HERPES GESTATIONIS IN THIRD TRIMESTER: ICD-10-CM

## 2020-02-19 DIAGNOSIS — Z36.85 SCREENING, ANTENATAL, FOR STREPTOCOCCUS B: ICD-10-CM

## 2020-02-19 DIAGNOSIS — O09.93 SUPERVISION OF HIGH RISK PREGNANCY IN THIRD TRIMESTER: Primary | ICD-10-CM

## 2020-02-19 PROCEDURE — 87653 STREP B DNA AMP PROBE: CPT | Performed by: OBSTETRICS & GYNECOLOGY

## 2020-02-19 PROCEDURE — 87186 SC STD MICRODIL/AGAR DIL: CPT | Performed by: OBSTETRICS & GYNECOLOGY

## 2020-02-19 PROCEDURE — 99207 ZZC PRENATAL VISIT: CPT | Performed by: OBSTETRICS & GYNECOLOGY

## 2020-02-19 RX ORDER — VALACYCLOVIR HYDROCHLORIDE 500 MG/1
500 TABLET, FILM COATED ORAL DAILY
Qty: 40 TABLET | Refills: 0 | Status: SHIPPED | OUTPATIENT
Start: 2020-02-19

## 2020-02-19 RX ORDER — VALACYCLOVIR HYDROCHLORIDE 500 MG/1
500 TABLET, FILM COATED ORAL DAILY
Qty: 40 TABLET | Refills: 0 | Status: SHIPPED | OUTPATIENT
Start: 2020-02-19 | End: 2020-02-19

## 2020-02-20 LAB
GP B STREP DNA SPEC QL NAA+PROBE: POSITIVE
SPECIMEN SOURCE: ABNORMAL

## 2020-02-20 NOTE — PROGRESS NOTES
Patient has been seeing the CNMs but just isn't sure if wants MD care or CNM care so decided to meet one of the MDs and see what she thinks  Feeling really uncomfortable, lots of pressure vaginally and low abd. 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 Doesn't want to stop working early but getting hard  Wears compression socks every day. Has a maternity belt and usually wears it but sometimes forgets. Definitely helps when wears it  Biggest issue is horrible reflux. Will roll over in bed at night and vomits acid. Taking pepcid once a day and sometimes BID and still barely helps. Has been really scared to take prilosec but strongly encouraged to try it and the brand name not omeprazole 20mg  Also bad insomnia. Partly discomfort and bathroom but a lot is the reflux  Discussed safety of tylenol and tylenol PM. May consider it but will start with prilosec first and see how much that helps  GBS obtained. cvx is FT/tight 1/30/medium/mid and -2  Patient with known HSV 1 genitally. Had many breakouts years ago, always on her thigh. On valtrex felt like didn't control it as well as acyclovir. However no recent outbreaks for several  Years. Acyclovir prevention is BID and valtrex is Qday so will do the latter but if any sx of HSV will let me know  Discussed primary c/s if active outbreak vs tegaderm and bandage over a thigh breakout if needed  Return 1 wk

## 2020-02-23 LAB
BACTERIA SPEC CULT: ABNORMAL
SPECIMEN SOURCE: ABNORMAL

## 2020-02-26 ENCOUNTER — PRENATAL OFFICE VISIT (OUTPATIENT)
Dept: OBGYN | Facility: CLINIC | Age: 30
End: 2020-02-26
Payer: COMMERCIAL

## 2020-02-26 VITALS — BODY MASS INDEX: 33.34 KG/M2 | WEIGHT: 198.8 LBS | SYSTOLIC BLOOD PRESSURE: 102 MMHG | DIASTOLIC BLOOD PRESSURE: 78 MMHG

## 2020-02-26 DIAGNOSIS — O09.93 HIGH-RISK PREGNANCY IN THIRD TRIMESTER: Primary | ICD-10-CM

## 2020-02-26 DIAGNOSIS — O26.43 HERPES GESTATIONIS IN THIRD TRIMESTER: ICD-10-CM

## 2020-02-26 PROCEDURE — 99207 ZZC PRENATAL VISIT: CPT | Performed by: OBSTETRICS & GYNECOLOGY

## 2020-02-26 RX ORDER — OMEPRAZOLE 10 MG/1
20 CAPSULE, DELAYED RELEASE ORAL DAILY
COMMUNITY
End: 2020-03-23

## 2020-02-26 NOTE — PROGRESS NOTES
Lots of tightening all the time and if stands up quick after sitting a while will get a bad pain in RLQ  First is BH and the latter is just muscular and discussed that  Started 20mg prilosec and helping a lot. Gone all day but has had 2 nights at 3am where still had bad gerd. Then took a pepcid and it helped it work whereas before the pepcid wouldn't help at all and would vomit acid  Ok for BID dosing, 40mg at once, or even night time dosing if she'd like  Hands and face more swollen. Legs not bad b/c always wears compression socks at work. No HA, no vision changes  Still lots of FM  Started her valtrex  Discussed her GBS positive status  cvx is much more anterior 1/1.5cm and 50% and lower station  Will membrane strip next week per her request  Reviewed si/sx of labor/srom and when to come in

## 2020-03-01 ENCOUNTER — ANESTHESIA EVENT (OUTPATIENT)
Dept: OBGYN | Facility: CLINIC | Age: 30
End: 2020-03-01
Payer: COMMERCIAL

## 2020-03-01 ENCOUNTER — ANESTHESIA (OUTPATIENT)
Dept: OBGYN | Facility: CLINIC | Age: 30
End: 2020-03-01
Payer: COMMERCIAL

## 2020-03-01 ENCOUNTER — NURSE TRIAGE (OUTPATIENT)
Dept: NURSING | Facility: CLINIC | Age: 30
End: 2020-03-01

## 2020-03-01 ENCOUNTER — HOSPITAL ENCOUNTER (INPATIENT)
Facility: CLINIC | Age: 30
LOS: 2 days | Discharge: HOME | End: 2020-03-03
Attending: OBSTETRICS & GYNECOLOGY | Admitting: OBSTETRICS & GYNECOLOGY
Payer: COMMERCIAL

## 2020-03-01 LAB
ABO + RH BLD: NORMAL
ABO + RH BLD: NORMAL
BLD GP AB SCN SERPL QL: NORMAL
BLOOD BANK CMNT PATIENT-IMP: NORMAL
RUPTURE OF FETAL MEMBRANES BY ROM PLUS: POSITIVE
SPECIMEN EXP DATE BLD: NORMAL

## 2020-03-01 PROCEDURE — 25000125 ZZHC RX 250: Performed by: OBSTETRICS & GYNECOLOGY

## 2020-03-01 PROCEDURE — 25000128 H RX IP 250 OP 636: Performed by: ANESTHESIOLOGY

## 2020-03-01 PROCEDURE — 25000132 ZZH RX MED GY IP 250 OP 250 PS 637: Performed by: OBSTETRICS & GYNECOLOGY

## 2020-03-01 PROCEDURE — 37000011 ZZH ANESTHESIA WARD SERVICE

## 2020-03-01 PROCEDURE — 3E0R3BZ INTRODUCTION OF ANESTHETIC AGENT INTO SPINAL CANAL, PERCUTANEOUS APPROACH: ICD-10-PCS | Performed by: ANESTHESIOLOGY

## 2020-03-01 PROCEDURE — G0463 HOSPITAL OUTPT CLINIC VISIT: HCPCS | Mod: 25

## 2020-03-01 PROCEDURE — 86780 TREPONEMA PALLIDUM: CPT | Performed by: OBSTETRICS & GYNECOLOGY

## 2020-03-01 PROCEDURE — 59025 FETAL NON-STRESS TEST: CPT

## 2020-03-01 PROCEDURE — 00HU33Z INSERTION OF INFUSION DEVICE INTO SPINAL CANAL, PERCUTANEOUS APPROACH: ICD-10-PCS | Performed by: ANESTHESIOLOGY

## 2020-03-01 PROCEDURE — 12000000 ZZH R&B MED SURG/OB

## 2020-03-01 PROCEDURE — 25000132 ZZH RX MED GY IP 250 OP 250 PS 637: Performed by: ANESTHESIOLOGY

## 2020-03-01 PROCEDURE — 25800030 ZZH RX IP 258 OP 636: Performed by: OBSTETRICS & GYNECOLOGY

## 2020-03-01 PROCEDURE — 25000125 ZZHC RX 250: Performed by: ANESTHESIOLOGY

## 2020-03-01 PROCEDURE — 86900 BLOOD TYPING SEROLOGIC ABO: CPT | Performed by: OBSTETRICS & GYNECOLOGY

## 2020-03-01 PROCEDURE — 86901 BLOOD TYPING SEROLOGIC RH(D): CPT | Performed by: OBSTETRICS & GYNECOLOGY

## 2020-03-01 PROCEDURE — 25000128 H RX IP 250 OP 636: Performed by: OBSTETRICS & GYNECOLOGY

## 2020-03-01 PROCEDURE — 84112 EVAL AMNIOTIC FLUID PROTEIN: CPT | Performed by: OBSTETRICS & GYNECOLOGY

## 2020-03-01 PROCEDURE — 36415 COLL VENOUS BLD VENIPUNCTURE: CPT | Performed by: OBSTETRICS & GYNECOLOGY

## 2020-03-01 PROCEDURE — 86850 RBC ANTIBODY SCREEN: CPT | Performed by: OBSTETRICS & GYNECOLOGY

## 2020-03-01 RX ORDER — FENTANYL CITRATE 50 UG/ML
100 INJECTION, SOLUTION INTRAMUSCULAR; INTRAVENOUS ONCE
Status: COMPLETED | OUTPATIENT
Start: 2020-03-01 | End: 2020-03-01

## 2020-03-01 RX ORDER — CITRIC ACID/SODIUM CITRATE 334-500MG
30 SOLUTION, ORAL ORAL ONCE
Status: COMPLETED | OUTPATIENT
Start: 2020-03-01 | End: 2020-03-01

## 2020-03-01 RX ORDER — IBUPROFEN 400 MG/1
800 TABLET, FILM COATED ORAL
Status: DISCONTINUED | OUTPATIENT
Start: 2020-03-01 | End: 2020-03-02

## 2020-03-01 RX ORDER — METHYLERGONOVINE MALEATE 0.2 MG/ML
200 INJECTION INTRAVENOUS
Status: DISCONTINUED | OUTPATIENT
Start: 2020-03-01 | End: 2020-03-02

## 2020-03-01 RX ORDER — LIDOCAINE 40 MG/G
CREAM TOPICAL
Status: DISCONTINUED | OUTPATIENT
Start: 2020-03-01 | End: 2020-03-02

## 2020-03-01 RX ORDER — NALOXONE HYDROCHLORIDE 0.4 MG/ML
.1-.4 INJECTION, SOLUTION INTRAMUSCULAR; INTRAVENOUS; SUBCUTANEOUS
Status: DISCONTINUED | OUTPATIENT
Start: 2020-03-01 | End: 2020-03-02

## 2020-03-01 RX ORDER — ONDANSETRON 4 MG/1
4 TABLET, ORALLY DISINTEGRATING ORAL EVERY 6 HOURS PRN
Status: DISCONTINUED | OUTPATIENT
Start: 2020-03-01 | End: 2020-03-02

## 2020-03-01 RX ORDER — ONDANSETRON 2 MG/ML
4 INJECTION INTRAMUSCULAR; INTRAVENOUS EVERY 6 HOURS PRN
Status: DISCONTINUED | OUTPATIENT
Start: 2020-03-01 | End: 2020-03-02

## 2020-03-01 RX ORDER — CARBOPROST TROMETHAMINE 250 UG/ML
250 INJECTION, SOLUTION INTRAMUSCULAR
Status: DISCONTINUED | OUTPATIENT
Start: 2020-03-01 | End: 2020-03-02

## 2020-03-01 RX ORDER — EPHEDRINE SULFATE 50 MG/ML
5 INJECTION, SOLUTION INTRAMUSCULAR; INTRAVENOUS; SUBCUTANEOUS
Status: DISCONTINUED | OUTPATIENT
Start: 2020-03-01 | End: 2020-03-02

## 2020-03-01 RX ORDER — SODIUM CHLORIDE, SODIUM LACTATE, POTASSIUM CHLORIDE, CALCIUM CHLORIDE 600; 310; 30; 20 MG/100ML; MG/100ML; MG/100ML; MG/100ML
INJECTION, SOLUTION INTRAVENOUS CONTINUOUS
Status: DISCONTINUED | OUTPATIENT
Start: 2020-03-01 | End: 2020-03-02

## 2020-03-01 RX ORDER — ROPIVACAINE HYDROCHLORIDE 2 MG/ML
10 INJECTION, SOLUTION EPIDURAL; INFILTRATION; PERINEURAL ONCE
Status: COMPLETED | OUTPATIENT
Start: 2020-03-01 | End: 2020-03-01

## 2020-03-01 RX ORDER — ACETAMINOPHEN 325 MG/1
650 TABLET ORAL EVERY 4 HOURS PRN
Status: DISCONTINUED | OUTPATIENT
Start: 2020-03-01 | End: 2020-03-02

## 2020-03-01 RX ORDER — FENTANYL CITRATE 50 UG/ML
50-100 INJECTION, SOLUTION INTRAMUSCULAR; INTRAVENOUS
Status: DISCONTINUED | OUTPATIENT
Start: 2020-03-01 | End: 2020-03-02

## 2020-03-01 RX ORDER — OXYTOCIN/0.9 % SODIUM CHLORIDE 30/500 ML
100-340 PLASTIC BAG, INJECTION (ML) INTRAVENOUS CONTINUOUS PRN
Status: COMPLETED | OUTPATIENT
Start: 2020-03-01 | End: 2020-03-02

## 2020-03-01 RX ORDER — FAMOTIDINE 20 MG/1
20 TABLET, FILM COATED ORAL ONCE
Status: COMPLETED | OUTPATIENT
Start: 2020-03-01 | End: 2020-03-01

## 2020-03-01 RX ORDER — OXYCODONE AND ACETAMINOPHEN 5; 325 MG/1; MG/1
1 TABLET ORAL
Status: DISCONTINUED | OUTPATIENT
Start: 2020-03-01 | End: 2020-03-02

## 2020-03-01 RX ORDER — LIDOCAINE HYDROCHLORIDE AND EPINEPHRINE 15; 5 MG/ML; UG/ML
INJECTION, SOLUTION EPIDURAL PRN
Status: DISCONTINUED | OUTPATIENT
Start: 2020-03-01 | End: 2020-03-02 | Stop reason: HOSPADM

## 2020-03-01 RX ORDER — OXYTOCIN 10 [USP'U]/ML
10 INJECTION, SOLUTION INTRAMUSCULAR; INTRAVENOUS
Status: DISCONTINUED | OUTPATIENT
Start: 2020-03-01 | End: 2020-03-02

## 2020-03-01 RX ORDER — ONDANSETRON 2 MG/ML
4 INJECTION INTRAMUSCULAR; INTRAVENOUS EVERY 6 HOURS PRN
Status: DISCONTINUED | OUTPATIENT
Start: 2020-03-01 | End: 2020-03-01

## 2020-03-01 RX ORDER — OXYTOCIN/0.9 % SODIUM CHLORIDE 30/500 ML
1-24 PLASTIC BAG, INJECTION (ML) INTRAVENOUS CONTINUOUS
Status: DISCONTINUED | OUTPATIENT
Start: 2020-03-01 | End: 2020-03-02

## 2020-03-01 RX ORDER — NALBUPHINE HYDROCHLORIDE 10 MG/ML
2.5-5 INJECTION, SOLUTION INTRAMUSCULAR; INTRAVENOUS; SUBCUTANEOUS EVERY 6 HOURS PRN
Status: DISCONTINUED | OUTPATIENT
Start: 2020-03-01 | End: 2020-03-02

## 2020-03-01 RX ORDER — PENICILLIN G POTASSIUM 5000000 [IU]/1
5 INJECTION, POWDER, FOR SOLUTION INTRAMUSCULAR; INTRAVENOUS ONCE
Status: COMPLETED | OUTPATIENT
Start: 2020-03-01 | End: 2020-03-01

## 2020-03-01 RX ADMIN — FENTANYL CITRATE 100 MCG: 50 INJECTION, SOLUTION INTRAMUSCULAR; INTRAVENOUS at 16:50

## 2020-03-01 RX ADMIN — ROPIVACAINE HYDROCHLORIDE 8 ML: 2 INJECTION, SOLUTION EPIDURAL; INFILTRATION at 18:30

## 2020-03-01 RX ADMIN — SODIUM CHLORIDE, POTASSIUM CHLORIDE, SODIUM LACTATE AND CALCIUM CHLORIDE: 600; 310; 30; 20 INJECTION, SOLUTION INTRAVENOUS at 18:58

## 2020-03-01 RX ADMIN — Medication 12 ML/HR: at 23:23

## 2020-03-01 RX ADMIN — PENICILLIN G POTASSIUM 5 MILLION UNITS: 5000000 POWDER, FOR SOLUTION INTRAMUSCULAR; INTRAPLEURAL; INTRATHECAL; INTRAVENOUS at 14:10

## 2020-03-01 RX ADMIN — Medication 12 ML/HR: at 18:31

## 2020-03-01 RX ADMIN — Medication 2 MILLI-UNITS/MIN: at 22:11

## 2020-03-01 RX ADMIN — SODIUM CITRATE AND CITRIC ACID MONOHYDRATE 30 ML: 500; 334 SOLUTION ORAL at 18:33

## 2020-03-01 RX ADMIN — SODIUM CHLORIDE 2.5 MILLION UNITS: 9 INJECTION, SOLUTION INTRAVENOUS at 22:32

## 2020-03-01 RX ADMIN — SODIUM CHLORIDE, POTASSIUM CHLORIDE, SODIUM LACTATE AND CALCIUM CHLORIDE: 600; 310; 30; 20 INJECTION, SOLUTION INTRAVENOUS at 13:55

## 2020-03-01 RX ADMIN — LIDOCAINE HYDROCHLORIDE AND EPINEPHRINE 3 ML: 15; 5 INJECTION, SOLUTION EPIDURAL at 18:28

## 2020-03-01 RX ADMIN — FENTANYL CITRATE 100 MCG: 50 INJECTION, SOLUTION INTRAMUSCULAR; INTRAVENOUS at 18:33

## 2020-03-01 RX ADMIN — FAMOTIDINE 20 MG: 20 TABLET, FILM COATED ORAL at 22:40

## 2020-03-01 RX ADMIN — SODIUM CHLORIDE 2.5 MILLION UNITS: 9 INJECTION, SOLUTION INTRAVENOUS at 18:43

## 2020-03-01 ASSESSMENT — MIFFLIN-ST. JEOR: SCORE: 1598.59

## 2020-03-01 NOTE — ANESTHESIA PREPROCEDURE EVALUATION
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     Pap smear abnormality of cervix      Past Surgical History:   Procedure Laterality Date     C BREAST AUGMENTATION - TIER 2  2014     ENT SURGERY Bilateral 2010    wisdom teeth extraction                  Juvenile rheumatoid arthritis  Migraine             Lab Results   Component Value Date    WBC 11.5 (H) 2019    HGB 11.6 (L) 2019    HCT 34.8 (L) 2019     2019    TSH 1.98 2019       Preop Vitals  BP Readings from Last 3 Encounters:   20 138/81   20 102/78   20 112/76    Pulse Readings from Last 3 Encounters:   20 94   19 80   19 110      Resp Readings from Last 3 Encounters:   20 16   18 16    SpO2 Readings from Last 3 Encounters:   18 98%      Temp Readings from Last 1 Encounters:   20 36.8  C (98.2  F) (Temporal)    Ht Readings from Last 1 Encounters:   20 1.626 m (5' 4\")      Wt Readings from Last 1 Encounters:   20 89.4 kg (197 lb)    Estimated body mass index is 33.81 kg/m  as calculated from the following:    Height as of this encounter: 1.626 m (5' 4\").    Weight as of this encounter: 89.4 kg (197 lb).       Anesthesia Plan      History & Physical Review      ASA Status:  2 .  OB Epidural Asa: 2            Postoperative Care      Consents  Anesthetic plan, risks, benefits and alternatives discussed with:  Patient..                 Yordan Baeza MD  "

## 2020-03-01 NOTE — PROGRESS NOTES
1210: Pt presents to [facility] for membrane assessment.  1215: Consent given to place external monitors, and to obtain medical history and vital signs. Pt states she started leaking fluid at 1050, and soaked through her underwear and pants. Pt states it was clear in color. Pt states she is having some abdominal cramping. Denies any vaginal bleeding.  1220: Consent given to collect ROM+ and for SVE. SVE 1-2/70/-1. ROM+ sent to lab. Awaiting results.  1330: ROM+ positive. FHR reactive. Telephone orders from Dr. Mireles to admit pt for labor. Report to Jenniffer KAN RN. Pt agrees with plan of care. Monitors removed and to room 215.

## 2020-03-01 NOTE — TELEPHONE ENCOUNTER
"Lilia  38 weeks gestational age, reports that her bag of water broke. No contractions, no vaginal bleeding.    PCP Antoinette.    Plans to deliver at [facility]. FNA advised to have her go to L&D.    [facility] notified [facility] L&D charge nurse that she's heading in.    Jacqueline Cash RN/[place] Nurse Advisor      Reason for Disposition    Leakage of fluid from vagina    Additional Information    Negative: [1] SEVERE abdominal pain (e.g., excruciating) AND [2] constant AND [3] present > 1 hour    Negative: Severe bleeding (e.g., continuous red blood from vagina, or large blood clots)    Negative: Umbilical cord hanging out of the vagina (shiny, white, curled appearance, \"like telephone cord\")    Negative: Can see baby    Negative: Uncontrollable urge to push (i.e., feels like baby is coming out now)    Negative: Sounds like a life-threatening emergency to the triager    Negative: Vaginal bleeding    Protocols used: PREGNANCY - RUPTURE OF [id number]      "

## 2020-03-02 PROCEDURE — 59400 OBSTETRICAL CARE: CPT | Performed by: OBSTETRICS & GYNECOLOGY

## 2020-03-02 PROCEDURE — 25000128 H RX IP 250 OP 636: Performed by: OBSTETRICS & GYNECOLOGY

## 2020-03-02 PROCEDURE — 25800030 ZZH RX IP 258 OP 636: Performed by: OBSTETRICS & GYNECOLOGY

## 2020-03-02 PROCEDURE — 0KQM0ZZ REPAIR PERINEUM MUSCLE, OPEN APPROACH: ICD-10-PCS | Performed by: OBSTETRICS & GYNECOLOGY

## 2020-03-02 PROCEDURE — 12000035 ZZH R&B POSTPARTUM

## 2020-03-02 PROCEDURE — 25000125 ZZHC RX 250: Performed by: OBSTETRICS & GYNECOLOGY

## 2020-03-02 PROCEDURE — 72200001 ZZH LABOR CARE VAGINAL DELIVERY SINGLE

## 2020-03-02 PROCEDURE — 25000132 ZZH RX MED GY IP 250 OP 250 PS 637: Performed by: OBSTETRICS & GYNECOLOGY

## 2020-03-02 RX ORDER — OXYCODONE HYDROCHLORIDE 5 MG/1
5 TABLET ORAL EVERY 4 HOURS PRN
Status: DISCONTINUED | OUTPATIENT
Start: 2020-03-02 | End: 2020-03-03 | Stop reason: HOSPADM

## 2020-03-02 RX ORDER — LANOLIN 100 %
OINTMENT (GRAM) TOPICAL
Status: DISCONTINUED | OUTPATIENT
Start: 2020-03-02 | End: 2020-03-03 | Stop reason: HOSPADM

## 2020-03-02 RX ORDER — DOCUSATE SODIUM 100 MG/1
100 CAPSULE, LIQUID FILLED ORAL 2 TIMES DAILY
Status: DISCONTINUED | OUTPATIENT
Start: 2020-03-02 | End: 2020-03-03 | Stop reason: HOSPADM

## 2020-03-02 RX ORDER — HYDROCORTISONE 2.5 %
CREAM (GRAM) TOPICAL 3 TIMES DAILY PRN
Status: DISCONTINUED | OUTPATIENT
Start: 2020-03-02 | End: 2020-03-03 | Stop reason: HOSPADM

## 2020-03-02 RX ORDER — NALOXONE HYDROCHLORIDE 0.4 MG/ML
.1-.4 INJECTION, SOLUTION INTRAMUSCULAR; INTRAVENOUS; SUBCUTANEOUS
Status: DISCONTINUED | OUTPATIENT
Start: 2020-03-02 | End: 2020-03-03 | Stop reason: HOSPADM

## 2020-03-02 RX ORDER — OXYTOCIN/0.9 % SODIUM CHLORIDE 30/500 ML
340 PLASTIC BAG, INJECTION (ML) INTRAVENOUS CONTINUOUS PRN
Status: DISCONTINUED | OUTPATIENT
Start: 2020-03-02 | End: 2020-03-03 | Stop reason: HOSPADM

## 2020-03-02 RX ORDER — ACETAMINOPHEN 325 MG/1
650 TABLET ORAL EVERY 4 HOURS PRN
Status: DISCONTINUED | OUTPATIENT
Start: 2020-03-02 | End: 2020-03-03 | Stop reason: HOSPADM

## 2020-03-02 RX ORDER — IBUPROFEN 400 MG/1
800 TABLET, FILM COATED ORAL EVERY 6 HOURS PRN
Status: DISCONTINUED | OUTPATIENT
Start: 2020-03-02 | End: 2020-03-03 | Stop reason: HOSPADM

## 2020-03-02 RX ORDER — BISACODYL 10 MG
10 SUPPOSITORY, RECTAL RECTAL DAILY PRN
Status: DISCONTINUED | OUTPATIENT
Start: 2020-03-04 | End: 2020-03-03 | Stop reason: HOSPADM

## 2020-03-02 RX ORDER — OXYTOCIN/0.9 % SODIUM CHLORIDE 30/500 ML
100 PLASTIC BAG, INJECTION (ML) INTRAVENOUS CONTINUOUS
Status: DISCONTINUED | OUTPATIENT
Start: 2020-03-02 | End: 2020-03-03 | Stop reason: HOSPADM

## 2020-03-02 RX ORDER — OXYTOCIN 10 [USP'U]/ML
10 INJECTION, SOLUTION INTRAMUSCULAR; INTRAVENOUS
Status: DISCONTINUED | OUTPATIENT
Start: 2020-03-02 | End: 2020-03-03 | Stop reason: HOSPADM

## 2020-03-02 RX ADMIN — SODIUM CHLORIDE 2.5 MILLION UNITS: 9 INJECTION, SOLUTION INTRAVENOUS at 02:02

## 2020-03-02 RX ADMIN — IBUPROFEN 800 MG: 400 TABLET, FILM COATED ORAL at 11:33

## 2020-03-02 RX ADMIN — DOCUSATE SODIUM 100 MG: 100 CAPSULE, LIQUID FILLED ORAL at 21:12

## 2020-03-02 RX ADMIN — IBUPROFEN 800 MG: 400 TABLET, FILM COATED ORAL at 17:33

## 2020-03-02 RX ADMIN — Medication 340 ML/HR: at 02:54

## 2020-03-02 RX ADMIN — ACETAMINOPHEN 650 MG: 325 TABLET, FILM COATED ORAL at 23:29

## 2020-03-02 RX ADMIN — ACETAMINOPHEN 650 MG: 325 TABLET, FILM COATED ORAL at 17:33

## 2020-03-02 RX ADMIN — ACETAMINOPHEN 650 MG: 325 TABLET, FILM COATED ORAL at 11:33

## 2020-03-02 RX ADMIN — ACETAMINOPHEN 650 MG: 325 TABLET, FILM COATED ORAL at 04:05

## 2020-03-02 RX ADMIN — DOCUSATE SODIUM 100 MG: 100 CAPSULE, LIQUID FILLED ORAL at 07:46

## 2020-03-02 RX ADMIN — IBUPROFEN 800 MG: 400 TABLET, FILM COATED ORAL at 04:05

## 2020-03-02 RX ADMIN — IBUPROFEN 800 MG: 400 TABLET, FILM COATED ORAL at 23:29

## 2020-03-02 NOTE — L&D DELIVERY NOTE
Delivery Date:  2020      Lilia is a 29-year-old G1, P0 who presented to Labor and Delivery at 38 weeks gestation with spontaneous rupture of membranes.  Lilia's pregnancy was overall uncomplicated and followed by both the Midwife Service as well as Dr. Curry.  Lilia opted to transfer to the Physician Service at 36 weeks gestation.  Prenatal laboratories were all within normal limits including blood type A positive, rubella status immune, Glucola within normal limits and GBS positive.  Lilia does have a history of herpes and was on suppressive Valtrex prior to delivery with no active outbreaks noted.  Lilia had negative  as well as preparent screening.  She received both pertussis and flu vaccinations during this pregnancy.  Estimated fetal weight was 6-1/2 to 7 pounds.      On arrival to Labor and Delivery, Lilia was confirmed to be ruptured and was found to be 1 cm dilated, 70% effaced and -1 station.  She was admitted to Labor and Delivery and expectantly managed.  IV penicillin was started for group beta strep positive status.  Lilia did receive 1 dose of IV fentanyl and eventually opted for epidural for pain control.  Following epidural placement, she was 2-3 cm dilated and -2 station.  Pitocin was eventually started for augmentation and shortly after initiation, Lilia did have a handful of repetitive decelerations.  Pitocin was therefore discontinued.  Oxygen was applied and position was changed with excellent recovery.  Lilia continued to contract and make change on her own.  Pitocin was restarted at 9-10 cm and 0 station, at which time Lilia went on to become complete.  With significant rectal and perineal pressure, Lilia became pushing immediately.  With excellent maternal effort, Lilia delivered a healthy male infant on the left occiput anterior position.  Nuchal cord x 1 was reduced on the perineum and remainder of the infant was delivered and placed on the maternal  abdomen.  Cord clamping was delayed by 1 minute.  Placenta was delivered spontaneously and intact.  Small second-degree perineal laceration was repaired in the usual fashion with excellent reapproximation.  Fundus was firm and bleeding was minimal.  Both mother and infant were doing very well following delivery and are stable for recovery.      DELIVERY TIMES:  First stage of labor 10 hours, second stage of labor 12 minutes, third stage of labor not yet recorded.      FINAL DIAGNOSES:   1.  Spontaneous vaginal delivery of 38+1 week male infant with a weight not yet recorded and Apgars of 9 and 9.   2.  Group B Streptococcus positive status for which adequate penicillin doses were given.   3.  Epidural for pain control.   4.  Second-degree perineal laceration.         GELY MURPHY MD             D: 2020   T: 2020   MT: SANTI      Name:     REINA ALEXANDRA   MRN:      [id number]        Account:        [id number]   :      1990        Delivery Date:  2020               Document: [id number]       cc: April Curry MD

## 2020-03-02 NOTE — PROVIDER NOTIFICATION
03/01/20 1810   Provider Notification   Provider Name/Title Dr. Baeza   Method of Notification At Bedside   Request Evaluate in Person   Notification Reason Pain   Dr. Baeza at bedside to place epidural. Consent signed. Timeout taken. Patient tolerated well.

## 2020-03-02 NOTE — ANESTHESIA PROCEDURE NOTES
Peripheral nerve/Neuraxial procedure note : epidural catheter  Pre-Procedure  Performed by Yordan Baeza MD  Location: OB      Pre-Anesthestic Checklist: patient identified, IV checked, risks and benefits discussed, informed consent, monitors and equipment checked, pre-op evaluation and at physician/surgeon's request    Timeout  Correct Patient: Yes   Correct Procedure: Yes   Correct Site: Yes   Correct Laterality: N/A   Correct Position: Yes   Site Marked: N/A   .   Procedure Documentation    Diagnosis:Labor Pain.    Procedure: epidural catheter, .   Patient Position:sitting Insertion Site:L3-4  (midline approach) Local skin infiltrated with mL of 1% lidocaine.  TOMASZ at 5 cm    Patient Prep/Sterile Barriers; mask, sterile gloves, povidone-iodine 7.5% surgical scrub, patient draped.  .  Needle: Touhy needle   Needle Gauge: 17.    Needle Length (Inches) 5   # of attempts: 1 and # of redirects:  .    Catheter: 19 G . .  Catheter threaded easily  3 cm epidural space.  6 cm at skin.   .    Assessment/Narrative  Paresthesias: No.  .  .  Aspiration negative for heme or CSF  . Test dose of 3 mL lidocaine 1.5% w/ 1:200,000 epinephrine at. Test dose negative for signs of intravascular, subdural or intrathecal injection. Comments:  Catheter secured with adhesive spray, tegaderm, and tape.

## 2020-03-02 NOTE — PLAN OF CARE
Data: Lilia Brown transferred to  412 via wheelchair at 0515. Baby transferred via moms arms.  Action: Receiving unit notified of transfer: Yes. Patient and family notified of room change. Report given to Marsha Shi RN at 0515 Belongings sent to receiving unit. Accompanied by Registered Nurse. Oriented patient to surroundings. Call light within reach. ID bands double-checked with receiving RN.  Response: Patient tolerated transfer and is stable.

## 2020-03-02 NOTE — PROGRESS NOTES
"S: Pt doing well. Infant is being . Pain is well controlled.    O: /78 (BP Location: Right arm)   Pulse 68   Temp 97.2  F (36.2  C) (Oral)   Resp 16   Ht 1.626 m (5' 4\")   Wt 89.4 kg (197 lb)   LMP 06/21/2019   SpO2 99%   Breastfeeding Unknown   BMI 33.81 kg/m          ABD:  Uterine fundus is firm, non-tender and at the level of the umbilicus                Hemoglobin   Date Value Ref Range Status   12/11/2019 11.6 (L) 11.7 - 15.7 g/dL Final            Lab Results   Component Value Date    RH Pos 03/01/2020       A:  Post-partum day #0 s/p Normal spontaneous vaginal delivery    P: Continue PP Cares    "

## 2020-03-02 NOTE — PROGRESS NOTES
Courtesy visit on day of delivery    Patient is doing well and has no concerns. Very happy with how everything went. Baby just latched very well for 15 full minutes with help of her RN.  Continue routine PP cares and plan d/c tomorrow or Wednesday.

## 2020-03-02 NOTE — H&P
No significant change in general health status based on examination of the patient, review of Nursing Admission Database and prenatal record.  30yo  admitted at 38+0wks with SROM.  Pregnancy uncomplicated and followed by midwive and then Dr. Curry.  Hx HSV on suppressive antiviral.  No recent outbreaks.  Has received 2 doses of IV penicillin for GBS+ status and comfortable with epidural.  cvx 4/80/-1 per RN.  Contractions spaced out and will augment with pitocin.      EFW: 6lb 8oz     Yahaira Mireles MD

## 2020-03-02 NOTE — PLAN OF CARE
VSS on room air. Fundus firm, midline, U/1. Bleeding scant to light, no clots. 2nd deg tear. Using ice and tucks. Tylenol and Ibuprofen for pain, well-controlled. Voiding. Moving well, steady gait. Regular diet. No nausea. Breastfeeding w/shield. S/L. AM Hgb ordered. Encouraged to call with questions/needs/concerns. Continue to monitor.

## 2020-03-02 NOTE — PROVIDER NOTIFICATION
03/01/20 1915   Provider Notification   Provider Name/Title Dr. Mireles   Method of Notification Electronic Page   Request Evaluate - Remote   Notification Reason SVE;Status Update;Pain;Uterine Activity;Labor Status;Maternal Vital Sign Change   Web page to Dr. Mireles. Updated on all of the following but not limited to SVE, uterine contractions, epidural placed, clements in, Cat I fetal heart tracing, and maternal tachycardia. No new orders at this time.

## 2020-03-02 NOTE — PROVIDER NOTIFICATION
03/02/20 0015   Provider Notification   Provider Name/Title Dr. Mireles    Method of Notification Electronic Page   Request Evaluate - Remote   Text page as follows: update SCOOBY EVANGELISTA 9/90/-1 feeling lots of pressure   Per Dr. Mireles call to attend delivery. Will continue to monitor.

## 2020-03-02 NOTE — L&D DELIVERY NOTE
of viable male at 0242   Baby's weight not yet recorded   Apgars 9, 9     Placenta delivered spontaneously and intact   Nuchal cord x 1   2nd degree perineal laceration repaired    Primary OB: Antoinette

## 2020-03-02 NOTE — LACTATION NOTE
This note was copied from a baby's chart.  Initial visit with Lilia. Infant has been sleepy at the breast and has not latched yet. Lilia has a history of breast augmentation, so she has been pumping after attempted feeds. Lilia is getting drops of colostrum and finger-feeding the drops to the infant.     Reviewed general breastfeeding information.     Advised to breastfeed exclusively, on demand, avoid pacifiers, bottles and formula unless medically indicated. Encouraged rooming in, skin to skin, feeding on demand 8-12x/day or sooner if baby cues. Explained benefits of holding and skin to skin. Encouraged lots of skin to skin. Instructed on hand expression.     Lilia will be getting a pump from the hospital before getting discharged. Will follow up with [facility] outpatient.     No further questions at this time. Pt very receptive to information and smiling. Thanked CAESAR for help and tips. Will follow as needed.     Mary Jane Tyson, RN  Lactation Educator

## 2020-03-02 NOTE — PLAN OF CARE
Pt's pain controlled with Ibuprofen/Tylenol. Voiding without difficulty. Up and about in room. Will continue to monitor.

## 2020-03-03 VITALS
RESPIRATION RATE: 16 BRPM | BODY MASS INDEX: 33.63 KG/M2 | DIASTOLIC BLOOD PRESSURE: 85 MMHG | HEIGHT: 64 IN | HEART RATE: 92 BPM | SYSTOLIC BLOOD PRESSURE: 134 MMHG | TEMPERATURE: 97.6 F | OXYGEN SATURATION: 99 % | WEIGHT: 197 LBS

## 2020-03-03 LAB
HGB BLD-MCNC: 11.9 G/DL (ref 11.7–15.7)
T PALLIDUM AB SER QL: NONREACTIVE

## 2020-03-03 PROCEDURE — 85018 HEMOGLOBIN: CPT | Performed by: OBSTETRICS & GYNECOLOGY

## 2020-03-03 PROCEDURE — 36415 COLL VENOUS BLD VENIPUNCTURE: CPT | Performed by: OBSTETRICS & GYNECOLOGY

## 2020-03-03 PROCEDURE — 25000132 ZZH RX MED GY IP 250 OP 250 PS 637: Performed by: OBSTETRICS & GYNECOLOGY

## 2020-03-03 RX ORDER — AMOXICILLIN 250 MG
1-2 CAPSULE ORAL 2 TIMES DAILY PRN
Qty: 40 TABLET | Refills: 0 | Status: SHIPPED | OUTPATIENT
Start: 2020-03-03 | End: 2020-03-23

## 2020-03-03 RX ORDER — IBUPROFEN 800 MG/1
800 TABLET, FILM COATED ORAL EVERY 6 HOURS PRN
Qty: 40 TABLET | Refills: 0 | Status: SHIPPED | OUTPATIENT
Start: 2020-03-03 | End: 2020-03-23

## 2020-03-03 RX ADMIN — DOCUSATE SODIUM 100 MG: 100 CAPSULE, LIQUID FILLED ORAL at 07:47

## 2020-03-03 RX ADMIN — IBUPROFEN 800 MG: 400 TABLET, FILM COATED ORAL at 05:52

## 2020-03-03 RX ADMIN — ACETAMINOPHEN 650 MG: 325 TABLET, FILM COATED ORAL at 05:52

## 2020-03-03 RX ADMIN — ACETAMINOPHEN 650 MG: 325 TABLET, FILM COATED ORAL at 11:39

## 2020-03-03 RX ADMIN — IBUPROFEN 800 MG: 400 TABLET, FILM COATED ORAL at 11:39

## 2020-03-03 NOTE — LACTATION NOTE
Lactation check-in prior to discharge; Lilia planning to discharge this evening. Infant had procedure and is sleeping at time of visit; undressed into diaper and breastfeeding attempt; too sleepy. Skin to skin encouraged. Discuss pumping after each feeding to stimulate; this is recommended when because infant sleepy and d/t nipple shield use.    Lilia explains that she is getting drops with the pump; discuss using hand expression as well. Plans to follow up with [facility] on Thursday.    Nia Jimenez RN, IBCLC

## 2020-03-03 NOTE — PLAN OF CARE
VSS. Fundus firm and midline. Scant flow. Pain 0/10, pain controled with tylenol and ibuprofen per MAR. Breastfeeding/ pumping. Ambulating free of dizziness or lightheaded. Voiding without difficulty. Encouraged to call with needs, questions, or concerns. Will continue to monitor.

## 2020-03-03 NOTE — PLAN OF CARE
Pt's pain controlled with Ibuprofen/Tylenol. Up and about in room. May want to go home this evening.

## 2020-03-03 NOTE — LACTATION NOTE
Routine Visit: Into room for feeding at the left breast with nipple shield. Infant sleepy and not wanting to feed. Started getting spitty while lactation in room. Bulb suctioned out. Taken to [facility] for continued monitoring during spitty episode. Pt pumping for infant.   Plan: Watch for feeding cues and feed every 2-3 hours and/or on demand. Encourage skin to skin to promote frequent feedings, thermoregulation and bonding. Will continue to follow.  ALAN Vanegas RN, BSN, PHN, IBCLC

## 2020-03-03 NOTE — PROGRESS NOTES
"S: Pt doing well. Infant is being . Pain is controlled with current analgesics.  Medication(s) being used: acetaminophen and ibuprofen (OTC). Having very minimal pain and mostly just sore nipples and not cramping or perineum. No bm yet. Baby was very spitty early am so went to nursery for a bit. Would like d/c home later today if peds will clear baby    O: /74   Pulse 80   Temp 97.8  F (36.6  C) (Axillary)   Resp 16   Ht 1.626 m (5' 4\")   Wt 89.4 kg (197 lb)   LMP 06/21/2019   SpO2 99%   Breastfeeding Unknown   BMI 33.81 kg/m          ABD:  Uterine fundus is firm, non-tender and at the level of the umbilicus                Hemoglobin   Date Value Ref Range Status   12/11/2019 11.6 (L) 11.7 - 15.7 g/dL Final            Lab Results   Component Value Date    RH Pos 03/01/2020       A:  Post-partum day #1 s/p Normal spontaneous vaginal delivery    P: Continue PP Cares  discharge home late afternoon/evening if peds will clear baby for d/c  Reviewed d/c instructions and f/u plan    "

## 2020-03-03 NOTE — ANESTHESIA POSTPROCEDURE EVALUATION
Patient: Lilia Brown    * No procedures listed *    Diagnosis:* No pre-op diagnosis entered *  Diagnosis Additional Information: No value filed.    Anesthesia Type:  No value filed.    Note:  Anesthesia Post Evaluation    Patient location during evaluation: Floor  Patient participation: Able to fully participate in evaluation  Level of consciousness: awake and alert  Cardiovascular status: acceptable  Respiratory status: acceptable       Comments: Patient satisfied with epidural experience.  She denies lower extremity numbness and weakness and has had no difficulty with ambulation.  She denies pain/tenderness at epidural site.            Last vitals:  Vitals:    03/02/20 0515 03/02/20 0735 03/02/20 1733   BP: 131/78 103/66 115/75   Pulse: 68 89 86   Resp: 16 16 16   Temp: 36.2  C (97.2  F) 36.9  C (98.5  F) 36.7  C (98.1  F)   SpO2:            Electronically Signed By: Carl Catalan MD  March 2, 2020  6:56 PM

## 2020-03-03 NOTE — PLAN OF CARE
Vital signs stable. Up independently, voiding without difficulty.  Taking tylenol/ibuprofen for pain management. Working on breastfeeding infant every 2-3 hours,assisted with latch, using nipples shield. Pumping after feeding attempts.Encouraged to call for breastfeeding assistance, questions/concerns. Will continue to monitor.

## 2020-03-07 ENCOUNTER — OFFICE VISIT (OUTPATIENT)
Dept: URGENT CARE | Facility: URGENT CARE | Age: 30
End: 2020-03-07
Payer: COMMERCIAL

## 2020-03-07 ENCOUNTER — NURSE TRIAGE (OUTPATIENT)
Dept: NURSING | Facility: CLINIC | Age: 30
End: 2020-03-07

## 2020-03-07 VITALS — HEART RATE: 73 BPM | WEIGHT: 188 LBS | BODY MASS INDEX: 32.27 KG/M2 | OXYGEN SATURATION: 99 % | TEMPERATURE: 98.3 F

## 2020-03-07 DIAGNOSIS — N61.0 ACUTE MASTITIS: Primary | ICD-10-CM

## 2020-03-07 PROCEDURE — 99214 OFFICE O/P EST MOD 30 MIN: CPT | Performed by: FAMILY MEDICINE

## 2020-03-07 RX ORDER — CEPHALEXIN 500 MG/1
500 CAPSULE ORAL 4 TIMES DAILY
Qty: 40 CAPSULE | Refills: 0 | Status: SHIPPED | OUTPATIENT
Start: 2020-03-07 | End: 2020-03-23

## 2020-03-07 NOTE — TELEPHONE ENCOUNTER
Pt reports fever and chills x 3 days.  Breast warmth, pain, and lumps since yesterday.  Pt has not taken her temp but believes she has a fever.  She is taking Ibuprofen/Tylenol.  Vaginal delivery 3/2/20, breastfeeding.    Disposition:  See a provider within 4 hours, UC recommended.  She verbalized understanding and had no further questions.     Mirian Rodriguez RN/LAVON    Reason for Disposition    [1] Breast looks infected (e.g, spreading redness, feels hot or painful to touch) AND [2] fever > 100.4 F (38.0 C)    Additional Information    Negative: Sounds like a life-threatening emergency to the triager    Negative: Patient plans to continue breastfeeding    Negative: [1] SEVERE breast pain AND [2] fever > 103 F (39.4 C)    Negative: Patient sounds very sick or weak to the triager    Protocols used: POSTPARTUM - BREAST PAIN AND [id number]

## 2020-03-07 NOTE — PATIENT INSTRUCTIONS
Patient Education     Mastitis  Mastitis occurs when breast tissue becomes swollen and inflamed. This is almost always due to infection. Mastitis most often affects breastfeeding women during the first 6 weeks after childbirth. For this reason, it s also known as lactation mastitis. Infection may happen after a duct becomes clogged, causing milk to back up in the breast. Mastitis may also occur if bacteria enter the breast through small cracks in the nipple. (Less often, mastitis occurs in women who aren t breastfeeding. If you have mastitis that is not due to breastfeeding, your healthcare provider will give you more information as needed. Treatment may include some of the same home care measures listed below.)  Mastitis may cause flu-like symptoms such as fever, aches, and fatigue. The affected breast may feel painful, warm, tender, firm, or swollen. The skin over the breast may be red (often in a wedge-shaped pattern). You may feel a burning a sensation when breastfeeding.  In most cases, mastitis can be treated with antibiotics. This should clear the infection. If treatment is delayed, a pocket of pus (abscess) can form in the breast tissue. A procedure may then be needed to drain the pus. In severe cases of infection, other treatments may be needed.  Home care  Breastfeeding    It s very important to keep the milk flowing from the infected breast. Continue breastfeeding from both breasts as usual. This will not hurt the baby. If this is too painful, use a breast pump to remove milk from the infected side. This can be fed to your baby or discarded. Note: If you don't continue to breastfeed or pump your breast, bacteria can grow in the milk that is left in your breast. This can make your infection worse.    Tell your healthcare provider if you have problems with breastfeeding. He or she may suggest changes to your technique, if needed. You may also be referred to a lactation nurse or consultant for support with  breastfeeding.  General care    Take any medicines you re prescribed as directed. If you re taking antibiotics, be sure to complete all of the medicine even if you start to feel better. Over-the-counter pain medicines may also be recommended. Don t use breast creams or other products or medicines without talking to your healthcare provider first. Note: If you re concerned about taking medicines while breastfeeding, talk to your healthcare provider.    Rest as often as needed. Also be sure to drink plenty of fluids.    To help relieve pain and swelling, heat or ice may be used. Apply as often as directed by your provider.  ? Heat: Place a warm compress on the breast. Use a towel soaked in hot water, a heating pad, or a hot water bottle.  ? Cold: Place a cold compress on the breast. Use an ice pack or bag of ice wrapped in a thin towel. Never place a cold source directly on the skin.    Follow-up care  Follow up with your healthcare provider as advised.  When to seek medical advice  Call your healthcare provider right away if any of these occur:    Fever of 100.4 F (38 C) or higher, or as directed by your provider    Shaking chills    Worsening symptoms or symptoms that don't improve within 48 to 72 hours of starting treatment    New symptoms develop  Date Last Reviewed: 6/1/2018 2000-2019 [employer] [address] [place] [address], [place] [address]. All rights reserved. This information is not intended as a substitute for professional medical care. Always follow your healthcare professional's instructions.

## 2020-03-07 NOTE — PROGRESS NOTES
Subjective     Lilia Brown is a 30 year old female who presents to clinic today for the following health issues:    HPI   Chief Complaint   Patient presents with     Urgent Care     Breast Pain     5 days post partum,having breast pain and chills.       Duration: 3 days     Description (location/character/radiation): both breast painful, chills     Intensity:  moderate    Accompanying signs and symptoms: no fever, abnormal discharge     History (similar episodes/previous evaluation): 5 days post partum, breast feeding    Precipitating or alleviating factors: None    Therapies tried and outcome: OTC analgesia         Patient Active Problem List   Diagnosis     Pap smear abnormality of cervix     High-risk pregnancy in second trimester     History of PCR DNA positive for HSV1     History of breast augmentation     History of juvenile rheumatoid arthritis     Indication for care in labor or delivery     Vaginal delivery     Past Surgical History:   Procedure Laterality Date     C BREAST AUGMENTATION - TIER 2  2014     ENT SURGERY Bilateral 2010    wisdom teeth extraction       Social History     Tobacco Use     Smoking status: Never Smoker     Smokeless tobacco: Never Used   Substance Use Topics     Alcohol use: Not Currently     Family History   Problem Relation Age of Onset     Osteoporosis Mother      Cervical Cancer Mother 48        hysterectomy     Hyperlipidemia Father      Hypertension Father      Heart Disease Father      Cerebrovascular Disease Maternal Grandmother         CVA x 3     Breast Cancer Paternal Grandmother 30     Breast Cancer Paternal Aunt 50     No Known Problems Sister      No Known Problems Brother      No Known Problems Brother          Current Outpatient Medications   Medication Sig Dispense Refill     Prenat w/o U-MC-Deyfvxt-FA-DHA (PNV-DHA PO) Take 1 tablet by mouth daily        Famotidine (PEPCID PO) Take 20 mg by mouth daily as needed        ibuprofen (ADVIL/MOTRIN) 800 MG tablet  Take 1 tablet (800 mg) by mouth every 6 hours as needed for other (cramping) (Patient not taking: Reported on 3/7/2020) 40 tablet 0     omeprazole (PRILOSEC) 10 MG DR capsule Take 20 mg by mouth daily       senna-docusate (SENOKOT-S/PERICOLACE) 8.6-50 MG tablet Take 1-2 tablets by mouth 2 times daily as needed for constipation (Patient not taking: Reported on 3/7/2020) 40 tablet 0     valACYclovir 500 MG PO tablet Take 1 tablet (500 mg) by mouth daily 40 tablet 0     No Known Allergies  Recent Labs   Lab Test 08/30/19  1048   TSH 1.98      BP Readings from Last 3 Encounters:   03/03/20 134/85   02/26/20 102/78   02/19/20 112/76    Wt Readings from Last 3 Encounters:   03/07/20 85.3 kg (188 lb)   03/01/20 89.4 kg (197 lb)   02/26/20 90.2 kg (198 lb 12.8 oz)                 Reviewed and updated as needed this visit by Provider         Review of Systems   ROS COMP: Constitutional, HEENT, cardiovascular, pulmonary, gi and gu systems are negative, except as otherwise noted.      Objective    Pulse 73   Temp 98.3  F (36.8  C) (Oral)   Wt 85.3 kg (188 lb)   LMP 06/21/2019   SpO2 99%   BMI 32.27 kg/m    Body mass index is 32.27 kg/m .  Physical Exam   GENERAL: alert and no distress  NECK: no adenopathy, no asymmetry, masses, or scars and thyroid normal to palpation  RESP: lungs clear to auscultation - no rales, rhonchi or wheezes  BREAST: breasts engorged bilaterally, tender on palpation with some skin crack involving the nipple, no significant skin discoloration or warmth noted  CV: regular rates and rhythm, normal S1 S2, no S3 or S4 and no murmur, click or rub  MS: no gross musculoskeletal defects noted, no edema      Assessment & Plan     (N61.0) Acute mastitis  (primary encounter diagnosis)  Comment: Differential discussed in detail including acute mastitis.  Patient is 5 days postpartum, breast-feeding.  Treatment options discussed.  Cephalexin prescribed, common side effects discussed.  Suggested warm compresses  and to continue breast-feeding.  Return criteria discussed in detail, will be scheduling appointment with gynecology next week.  All questions answered.  Plan: cephALEXin (KEFLEX) 500 MG capsule               Patient Instructions     Patient Education     Mastitis  Mastitis occurs when breast tissue becomes swollen and inflamed. This is almost always due to infection. Mastitis most often affects breastfeeding women during the first 6 weeks after childbirth. For this reason, it s also known as lactation mastitis. Infection may happen after a duct becomes clogged, causing milk to back up in the breast. Mastitis may also occur if bacteria enter the breast through small cracks in the nipple. (Less often, mastitis occurs in women who aren t breastfeeding. If you have mastitis that is not due to breastfeeding, your healthcare provider will give you more information as needed. Treatment may include some of the same home care measures listed below.)  Mastitis may cause flu-like symptoms such as fever, aches, and fatigue. The affected breast may feel painful, warm, tender, firm, or swollen. The skin over the breast may be red (often in a wedge-shaped pattern). You may feel a burning a sensation when breastfeeding.  In most cases, mastitis can be treated with antibiotics. This should clear the infection. If treatment is delayed, a pocket of pus (abscess) can form in the breast tissue. A procedure may then be needed to drain the pus. In severe cases of infection, other treatments may be needed.  Home care  Breastfeeding    It s very important to keep the milk flowing from the infected breast. Continue breastfeeding from both breasts as usual. This will not hurt the baby. If this is too painful, use a breast pump to remove milk from the infected side. This can be fed to your baby or discarded. Note: If you don't continue to breastfeed or pump your breast, bacteria can grow in the milk that is left in your breast. This can make  your infection worse.    Tell your healthcare provider if you have problems with breastfeeding. He or she may suggest changes to your technique, if needed. You may also be referred to a lactation nurse or consultant for support with breastfeeding.  General care    Take any medicines you re prescribed as directed. If you re taking antibiotics, be sure to complete all of the medicine even if you start to feel better. Over-the-counter pain medicines may also be recommended. Don t use breast creams or other products or medicines without talking to your healthcare provider first. Note: If you re concerned about taking medicines while breastfeeding, talk to your healthcare provider.    Rest as often as needed. Also be sure to drink plenty of fluids.    To help relieve pain and swelling, heat or ice may be used. Apply as often as directed by your provider.  ? Heat: Place a warm compress on the breast. Use a towel soaked in hot water, a heating pad, or a hot water bottle.  ? Cold: Place a cold compress on the breast. Use an ice pack or bag of ice wrapped in a thin towel. Never place a cold source directly on the skin.    Follow-up care  Follow up with your healthcare provider as advised.  When to seek medical advice  Call your healthcare provider right away if any of these occur:    Fever of 100.4 F (38 C) or higher, or as directed by your provider    Shaking chills    Worsening symptoms or symptoms that don't improve within 48 to 72 hours of starting treatment    New symptoms develop  Date Last Reviewed: 6/1/2018 2000-2019 [employer]. [address] [place] [address], [place] [address]. All rights reserved. This information is not intended as a substitute for professional medical care. Always follow your healthcare professional's instructions.               CS Urgent Care Provider  [facility]

## 2020-03-11 ENCOUNTER — HEALTH MAINTENANCE LETTER (OUTPATIENT)
Age: 30
End: 2020-03-11

## 2020-03-23 ENCOUNTER — TELEPHONE (OUTPATIENT)
Dept: OBGYN | Facility: CLINIC | Age: 30
End: 2020-03-23

## 2020-03-23 ENCOUNTER — VIRTUAL VISIT (OUTPATIENT)
Dept: OBGYN | Facility: CLINIC | Age: 30
End: 2020-03-23
Payer: COMMERCIAL

## 2020-03-23 DIAGNOSIS — N64.4 BREAST PAIN IN FEMALE: Primary | ICD-10-CM

## 2020-03-23 DIAGNOSIS — O92.79 BREAST ENGORGEMENT, POSTPARTUM: ICD-10-CM

## 2020-03-23 PROCEDURE — 99213 OFFICE O/P EST LOW 20 MIN: CPT | Performed by: OBSTETRICS & GYNECOLOGY

## 2020-03-23 NOTE — PROGRESS NOTES
"Lilia Brown is a 30 year old female who is being evaluated via a billable telephone visit.      The patient has been notified of following:     \"This telephone visit will be conducted via a call between you and your physician/provider. We have found that certain health care needs can be provided without the need for a physical exam.  This service lets us provide the care you need with a short phone conversation.  If a prescription is necessary we can send it directly to your pharmacy.  If lab work is needed we can place an order for that and you can then stop by our lab to have the test done at a later time.    If during the course of the call the physician/provider feels a telephone visit is not appropriate, you will not be charged for this service.\"     Lilia Brown complains of    Chief Complaint   Patient presents with     Breast Problem     On 3/7/2020 went to  due to chills and breast pain. was given Keflex and finished on 3/18/2020. Continues to have chills and left breast lateral pain. Patient takes tylenol to help with pain. Patient continues to pump. Pain usually occurs at night.       I have reviewed and updated the patient's Past Medical History, Social History, Family History and Medication List.    ALLERGIES  Patient has no known allergies.    Additional provider notes: Patient is 3 weeks postpartum  Was seen in urgent care on 3/7 due to left breast pain and chills. Notes say patient did not have fever and temp was normal at her visit. They did not see redness.   She was treated with keflex for 10 days and symptoms improved.     Now patient notes she has been having chills again only at night and wakes up sweating but does not have a fever that she is aware of in past few days.  She only has the chills at night, not during the day. There is no redness on breast. The left side of her lateral breast is tender. She has been pumping only last few days since hasn't been able to get a good " latch with baby. Worked with lactation consultant already.   She has been taking tylenol.     I don't think she fits criteria for mastitis in the absence of fever and redness of breast. More likely she has a clogged duct. Discussed tilting breast pump multiple ways during use to help facilitate adequate emptying of breast         Assessment/Plan:  1. Breast pain in female    Recommend checking temp twice a day.  Watch for signs of red area on surface of breast in area with tenderness.   Call if these things happen and then   We will start an antibiotic.   Use warm washcloth pack on breast before starting to pump. I suspect she is not emptying breast enough and has breast engorgement as her source of symptoms     Her history is not consistent with mastitis at this time. I hesitate to treat without a diagnosis consistent as could encourage resistant organism.     Phone call duration: 15 minutes including time to review her records and history.     Dahlia Acosta MD

## 2020-03-23 NOTE — TELEPHONE ENCOUNTER
Reason for call:  Other   Patient called regarding (reason for call): appointment  Additional comments: patient would like to set up phone visit for left breast pain and night chills - she was seen at urgent care on 3/7/20 for these symptoms     Phone number to reach patient:  Cell number on file:    Telephone Information:   Mobile [phone]       Best Time:  anytime    Can we leave a detailed message on this number?  YES    Travel screening: Not Applicable

## 2020-03-24 ENCOUNTER — TELEPHONE (OUTPATIENT)
Dept: OBGYN | Facility: CLINIC | Age: 30
End: 2020-03-24

## 2020-03-24 RX ORDER — DICLOXACILLIN SODIUM 500 MG
500 CAPSULE ORAL EVERY 6 HOURS
Qty: 40 CAPSULE | Refills: 0 | Status: SHIPPED | OUTPATIENT
Start: 2020-03-24 | End: 2020-04-03

## 2020-03-24 NOTE — TELEPHONE ENCOUNTER
Recommend checking temp twice a day.  Watch for signs of red area on surface of breast in area with tenderness.   Call if these things happen and then   We will start an antibiotic.   Use warm washcloth pack on breast before starting to pump. I suspect she is not emptying breast enough and has breast engorgement as her source of symptoms    Her history is not consistent with mastitis at this time. I hesitate to treat without a diagnosis consistent as could encourage resistant organism.     Patient spoke with Dr. Acosta yesterday regarding the left lateral breast concerns.  At the time had no fever or redness.   Today Pt with concerns of:   [x] Breast pain  [x] Redness  [] Swelling  [x] Fever 100.5 rectal  [] Malaise   [x]Muscle aches  []Breast lump  [x] Other chills and body aches    Will route to provider to advise if antibiotics are recommended-will call pt back.    [facility].  Lila Mckenzie RN on 3/24/2020 at 11:12 AM

## 2020-03-24 NOTE — TELEPHONE ENCOUNTER
Patient has concerns about poss mastittis and was wondering if she could speak with Dr. Curry or nurse. Please return call.

## 2020-03-24 NOTE — TELEPHONE ENCOUNTER
Patient informed. Pt verbalized understanding, in agreement with plan, and voiced no further questions.  Lila Mckenzie, RN on 3/24/2020 at 3:11 PM

## 2020-04-02 ENCOUNTER — TELEPHONE (OUTPATIENT)
Dept: OBGYN | Facility: CLINIC | Age: 30
End: 2020-04-02

## 2020-04-02 DIAGNOSIS — O92.29 POSTPARTUM NIPPLE PAIN: ICD-10-CM

## 2020-04-02 NOTE — TELEPHONE ENCOUNTER
Called the pt back.  Reviewed the note from Dr REGALADO about follow up and reviewed use of the APNO cream that was just ordered for her.  Pt verbalizes understanding.  Sondra Felder RN on 4/2/2020 at 11:01 AM

## 2020-04-02 NOTE — TELEPHONE ENCOUNTER
3/2/20  - 4 wks pp  Breastfeeding-still some difficulty w latching but getting better  C/O:  Bilateral nipple pain - sharp pain which radiates into both breasts; nipples both red and sore  Does feel mastitis sx's improved - redness and swelling gone and continues on her second round of abx  Keflex was 1st round and 2nd dicloxacillin (still on this one)    Questioning nipple yeast infection.    Routing to provider to advise.  Rx sent for the APNO nipple cream - anything else?    Kaykay Rodriguez RN on 2020 at 10:35 AM

## 2020-04-02 NOTE — TELEPHONE ENCOUNTER
That's all I would recommend for now. If things do not improve since she has had two rounds of antibiotics she should come in for a breast exam.

## 2021-01-03 ENCOUNTER — HEALTH MAINTENANCE LETTER (OUTPATIENT)
Age: 31
End: 2021-01-03

## 2021-04-25 ENCOUNTER — HEALTH MAINTENANCE LETTER (OUTPATIENT)
Age: 31
End: 2021-04-25

## 2021-10-10 ENCOUNTER — HEALTH MAINTENANCE LETTER (OUTPATIENT)
Age: 31
End: 2021-10-10

## 2022-05-21 ENCOUNTER — HEALTH MAINTENANCE LETTER (OUTPATIENT)
Age: 32
End: 2022-05-21

## 2022-09-18 ENCOUNTER — HEALTH MAINTENANCE LETTER (OUTPATIENT)
Age: 32
End: 2022-09-18

## 2023-06-04 ENCOUNTER — HEALTH MAINTENANCE LETTER (OUTPATIENT)
Age: 33
End: 2023-06-04

## 2024-06-18 NOTE — PROGRESS NOTES
Feels much better, n/v has resolved   Discussed normal to feel fetal movement at 18-22 weeks  Denies loss of fluid/vb/contractions/pelvic pain   Desires AFP, is checking with insurance coverage. Order futured  GC/Chlamydia urine collection today  Reviewed NOB labs.   Anatomy ultrasound next visit between 18-22 weeks. Order futured  Return to clinic 4 weeks    STEFANO Smallwood, CHERYL AGARWAL, CHERYL         Per MD verbal order pt. Given arm sling, instructed on the use and care of this DME; instructed if insurance does not cover this DME pt./responsible party is responsible for the cost of the item, PT./responsible party verbalized understanding of instruction today.